# Patient Record
Sex: MALE | Race: WHITE | NOT HISPANIC OR LATINO | Employment: FULL TIME | URBAN - METROPOLITAN AREA
[De-identification: names, ages, dates, MRNs, and addresses within clinical notes are randomized per-mention and may not be internally consistent; named-entity substitution may affect disease eponyms.]

---

## 2018-05-11 ENCOUNTER — OFFICE VISIT - HEALTHEAST (OUTPATIENT)
Dept: FAMILY MEDICINE | Facility: CLINIC | Age: 43
End: 2018-05-11

## 2018-05-11 DIAGNOSIS — R59.0 POSTERIOR AURICULAR LYMPHADENOPATHY: ICD-10-CM

## 2018-05-11 DIAGNOSIS — R59.0 LYMPHADENOPATHY, SUPRACLAVICULAR: ICD-10-CM

## 2018-05-11 LAB
AMYLASE SERPL-CCNC: 114 U/L (ref 5–120)
BASOPHILS # BLD AUTO: 0.1 THOU/UL (ref 0–0.2)
BASOPHILS NFR BLD AUTO: 1 % (ref 0–2)
EOSINOPHIL # BLD AUTO: 0.1 THOU/UL (ref 0–0.4)
EOSINOPHIL NFR BLD AUTO: 1 % (ref 0–6)
ERYTHROCYTE [DISTWIDTH] IN BLOOD BY AUTOMATED COUNT: 12.7 % (ref 11–14.5)
HCT VFR BLD AUTO: 46.8 % (ref 40–54)
HGB BLD-MCNC: 16 G/DL (ref 14–18)
LYMPHOCYTES # BLD AUTO: 1.5 THOU/UL (ref 0.8–4.4)
LYMPHOCYTES NFR BLD AUTO: 25 % (ref 20–40)
MCH RBC QN AUTO: 30.2 PG (ref 27–34)
MCHC RBC AUTO-ENTMCNC: 34.2 G/DL (ref 32–36)
MCV RBC AUTO: 89 FL (ref 80–100)
MONOCYTES # BLD AUTO: 0.5 THOU/UL (ref 0–0.9)
MONOCYTES NFR BLD AUTO: 8 % (ref 2–10)
NEUTROPHILS # BLD AUTO: 4 THOU/UL (ref 2–7.7)
NEUTROPHILS NFR BLD AUTO: 66 % (ref 50–70)
PLATELET # BLD AUTO: 234 THOU/UL (ref 140–440)
PMV BLD AUTO: 10.4 FL (ref 8.5–12.5)
RBC # BLD AUTO: 5.29 MILL/UL (ref 4.4–6.2)
WBC: 6.2 THOU/UL (ref 4–11)

## 2018-05-11 ASSESSMENT — MIFFLIN-ST. JEOR: SCORE: 1971.48

## 2020-10-23 ENCOUNTER — VIRTUAL VISIT (OUTPATIENT)
Dept: FAMILY MEDICINE | Facility: OTHER | Age: 45
End: 2020-10-23
Payer: COMMERCIAL

## 2020-10-23 PROCEDURE — 99421 OL DIG E/M SVC 5-10 MIN: CPT | Performed by: NURSE PRACTITIONER

## 2020-10-23 NOTE — PROGRESS NOTES
"Date: 10/23/2020 06:28:08  Clinician: Racquel Quijano  Clinician NPI: [id number]  Patient: Rowdy Tan  Patient : 1975  Patient Address: [address] [place] [address]  Patient Phone: [phone]  Visit Protocol: URI  Patient Summary:  Rowdy is a 44 year old ( : 1975 ) male who initiated a [facility] Visit for COVID-19 (Coronavirus) evaluation and screening. When asked the question \"Please sign me up to receive news, health information and promotions from [facility].\", Rowdy responded \"No\".    Rowdy states his symptoms started 1-2 days ago.   His symptoms consist of a cough, nasal congestion, myalgia, chills, malaise, and a sore throat. He is experiencing difficulty breathing due to nasal congestion but he is not short of breath.   Symptom details     Nasal secretions: The color of his mucus is clear.    Cough: Rowdy coughs a few times an hour and his cough is more bothersome at night. Phlegm comes into his throat when he coughs. He believes his cough is caused by post-nasal drip. The color of the phlegm is clear.     Sore throat: Rowdy reports having mild throat pain (1-3 on a 10 point pain scale), does not have exudate on his tonsils, and can swallow liquids. He is not sure if the lymph nodes in his neck are enlarged. A rash has not appeared on the skin since the sore throat started.      Rowdy denies having ear pain, headache, wheezing, fever, anosmia, vomiting, rhinitis, nausea, facial pain or pressure, teeth pain, ageusia, and diarrhea. He also denies taking antibiotic medication in the past month and having recent facial or sinus surgery in the past 60 days.   Precipitating events  Rowdy is not sure if he has been exposed to someone with strep throat. He has not recently been exposed to someone with influenza. Rowdy has not been in close contact with any high risk individuals.   Pertinent COVID-19 (Coronavirus) information  In the past 14 days, Rowdy has not worked in " a congregate living setting.   He does not work or volunteer as healthcare worker or a  and does not work or volunteer in a healthcare facility.   Rowdy also has not lived in a congregate living setting in the past 14 days. He does not live with a healthcare worker.   Rowdy has had a close contact with a laboratory-confirmed COVID-19 patient within 14 days of symptom onset. Additional information about contact with COVID-19 (Coronavirus) patient as reported by the patient (free text): My son's friend just received a positive test and I've been around him in recent days   Since December 2019, Rowdy and has had upper respiratory infection (URI) or influenza-like illness. Has not been diagnosed with lab-confirmed COVID-19 test      Date(s) of previous URI or influenza-like illness (free-text): January 2020     Symptoms Rowdy experienced during previous URI or influenza-like illness as reported by the patient (free-text): I had a cold        Pertinent medical history  Rowdy does not need a return to work/school note.   Weight: 205 lbs   Rowdy smokes or uses smokeless tobacco.   Weight: 205 lbs    MEDICATIONS: No current medications, ALLERGIES: aspirin  Clinician Response:  Dear Rowdy,   Your symptoms show that you may have coronavirus (COVID-19). This illness can cause fever, cough and trouble breathing. Many people get a mild case and get better on their own. Some people can get very sick.  What should I do?  We would like to test you for this virus.   1. Please call [phone] to schedule your visit. Explain that you were referred by [facility] to have a COVID-19 test. Be ready to share your [facility] visit ID number.  The following will serve as your written order for this COVID Test, ordered by me, for the indication of suspected COVID [Z20.828]: The test will be ordered in [employer], our electronic health record, after you are scheduled. It will show as ordered and authorized by Steve Morgan MD.   "Order: COVID-19 (Coronavirus) PCR for [facility]MPTOMATIC [facility].      2. When it's time for your COVID test:  Stay at least 6 feet away from others. (If someone will drive you to your test, stay in the backseat, as far away from the  as you can.)   Cover your mouth and nose with a mask, tissue or washcloth.  Go straight to the testing site. Don't make any stops on the way there or back.      3.Starting now: Stay home and away from others (self-isolate) until:   You've had no fever---and no medicine that reduces fever---for one full day (24 hours). And...   Your other symptoms have gotten better. For example, your cough or breathing has improved. And...   At least 10 days have passed since your symptoms started.       During this time, don't leave the house except for testing or medical care.   Stay in your own room, even for meals. Use your own bathroom if you can.   Stay away from others in your home. No hugging, kissing or shaking hands. No visitors.  Don't go to work, school or anywhere else.    Clean \"high touch\" surfaces often (doorknobs, counters, handles, etc.). Use a household cleaning spray or wipes. You'll find a full list of  on the EPA website: www.epa.gov/pesticide-registration/list-n-disinfectants-use-against-sars-cov-2.   Cover your mouth and nose with a mask, tissue or washcloth to avoid spreading germs.  Wash your hands and face often. Use soap and water.  Caregivers in these groups are at risk for severe illness due to COVID-19:  o People 65 years and older  o People who live in a nursing home or long-term care facility  o People with chronic disease (lung, heart, cancer, diabetes, kidney, liver, immunologic)  o People who have a weakened immune system, including those who:   Are in cancer treatment  Take medicine that weakens the immune system, such as corticosteroids  Had a bone marrow or organ transplant  Have an immune deficiency  Have poorly controlled HIV or AIDS  Are " obese (body mass index of 40 or higher)  Smoke regularly   o Caregivers should wear gloves while washing dishes, handling laundry and cleaning bedrooms and bathrooms.  o Use caution when washing and drying laundry: Don't shake dirty laundry, and use the warmest water setting that you can.  o For more tips, go to www.cdc.gov/coronavirus/2019-ncov/downloads/10Things.pdf.    4.Sign up for [employer]. We know it's scary to hear that you might have COVID-19. We want to track your symptoms to make sure you're okay over the next 2 weeks. Please look for an email from [employer]---this is a free, online program that we'll use to keep in touch. To sign up, follow the link in the email. Learn more at [email or web address]  How can I take care of myself?   Get lots of rest. Drink extra fluids (unless a doctor has told you not to).   Take Tylenol (acetaminophen) for fever or pain. If you have liver or kidney problems, ask your family doctor if it's okay to take Tylenol.   Adults can take either:    650 mg (two 325 mg pills) every 4 to 6 hours, or...   1,000 mg (two 500 mg pills) every 8 hours as needed.    Note: Don't take more than 3,000 mg in one day. Acetaminophen is found in many medicines (both prescribed and over-the-counter medicines). Read all labels to be sure you don't take too much.   For children, check the Tylenol bottle for the right dose. The dose is based on the child's age or weight.    If you have other health problems (like cancer, heart failure, an organ transplant or severe kidney disease): Call your specialty clinic if you don't feel better in the next 2 days.       Know when to call 911. Emergency warning signs include:    Trouble breathing or shortness of breath Pain or pressure in the chest that doesn't go away Feeling confused like you haven't felt before, or not being able to wake up Bluish-colored lips or face.  Where can I get more information?   [facility] -- About COVID-19:  [email or web address]   CDC -- What to Do If You're Sick: www.cdc.gov/coronavirus/2019-ncov/about/steps-when-sick.html   CDC -- Ending Home Isolation: www.cdc.gov/coronavirus/2019-ncov/hcp/disposition-in-home-patients.html   CDC -- Caring for Someone: www.cdc.gov/coronavirus/2019-ncov/if-you-are-sick/care-for-someone.html   [facility] -- Interim Guidance for Hospital Discharge to Home: www.[facility].mn./diseases/coronavirus/hcp/hospdischarge.pdf   [facility] clinical trials (COVID-19 research studies): [email or web address]-clinical-trials    Below are the COVID-19 hotlines at the [place] Department of Health ([facility]). Interpreters are available.    For health questions: Call 162-782-3170 or 1-417.507.5796 (7 a.m. to 7 p.m.) For questions about schools and childcare: Call [phone] or 1-280.405.9359 (7 a.m. to 7 p.m.)    Diagnosis: Cough  Diagnosis ICD: R05

## 2020-10-25 ENCOUNTER — AMBULATORY - HEALTHEAST (OUTPATIENT)
Dept: FAMILY MEDICINE | Facility: CLINIC | Age: 45
End: 2020-10-25

## 2020-10-25 DIAGNOSIS — Z20.822 SUSPECTED COVID-19 VIRUS INFECTION: ICD-10-CM

## 2021-05-29 ENCOUNTER — RECORDS - HEALTHEAST (OUTPATIENT)
Dept: ADMINISTRATIVE | Facility: CLINIC | Age: 46
End: 2021-05-29

## 2021-05-31 ENCOUNTER — RECORDS - HEALTHEAST (OUTPATIENT)
Dept: ADMINISTRATIVE | Facility: CLINIC | Age: 46
End: 2021-05-31

## 2021-06-01 ENCOUNTER — RECORDS - HEALTHEAST (OUTPATIENT)
Dept: ADMINISTRATIVE | Facility: CLINIC | Age: 46
End: 2021-06-01

## 2021-06-01 VITALS — HEIGHT: 72 IN | BODY MASS INDEX: 31.69 KG/M2 | WEIGHT: 234 LBS

## 2021-06-16 PROBLEM — R22.1 LUMP ON NECK: Status: ACTIVE | Noted: 2018-05-11

## 2021-06-17 NOTE — PROGRESS NOTES
Assessment:     1. Lymphadenopathy, supraclavicular  Amylase    HM1(CBC and Differential)    HM1 (CBC with Diff)   2. Posterior auricular lymphadenopathy       Results for orders placed or performed in visit on 05/11/18   Amylase   Result Value Ref Range    Amylase 114 5 - 120 U/L   HM1 (CBC with Diff)   Result Value Ref Range    WBC 6.2 4.0 - 11.0 thou/uL    RBC 5.29 4.40 - 6.20 mill/uL    Hemoglobin 16.0 14.0 - 18.0 g/dL    Hematocrit 46.8 40.0 - 54.0 %    MCV 89 80 - 100 fL    MCH 30.2 27.0 - 34.0 pg    MCHC 34.2 32.0 - 36.0 g/dL    RDW 12.7 11.0 - 14.5 %    Platelets 234 140 - 440 thou/uL    MPV 10.4 8.5 - 12.5 fL    Neutrophils % 66 50 - 70 %    Lymphocytes % 25 20 - 40 %    Monocytes % 8 2 - 10 %    Eosinophils % 1 0 - 6 %    Basophils % 1 0 - 2 %    Neutrophils Absolute 4.0 2.0 - 7.7 thou/uL    Lymphocytes Absolute 1.5 0.8 - 4.4 thou/uL    Monocytes Absolute 0.5 0.0 - 0.9 thou/uL    Eosinophils Absolute 0.1 0.0 - 0.4 thou/uL    Basophils Absolute 0.1 0.0 - 0.2 thou/uL        Plan:     Discussed with the patient that he has inflammation of his lymph nodes.  Will do CBC to rule out infection and ambulates to rule out lymphadenopathy.  Discussed negative results with the patient. In the meantime I have advised patient to use ibuprofen for pain or discomfort.  He may also use a warm compress to the area.  Patient is aware that if his symptoms continues to be persistent he may need to follow-up with PCP for further evaluation.    Subjective:       42 y.o. male presents for evaluation of a lump on his right side of the neck.  Patient reports that he has felt the lump for about 6 weeks on the lateral side of the neck.  He also reports that yesterday he noticed some swelling behind his right ear.  It was very painful to touch.  He denies flulike symptoms, he reports that he has had a cough, persistent.  He has some allergies, denies runny nose.  He denies fever, no exposure to anyone with a illness.  Denies nausea,  "vomiting, diarrhea, or shortness of breath.  He reports that he noticed also a lump on either side of the neck, left side.  Which he usually get inflamed especially right before he gets a cold.  He is there to be evaluated just to make sure that everything is okay and is not malignant.      The following portions of the patient's history were reviewed and updated as appropriate: allergies, current medications, past family history, past medical history, past social history, past surgical history and problem list.    Review of Systems  A 12 point comprehensive review of systems was negative except as noted.     Objective:      /86 (Patient Site: Right Arm, Patient Position: Sitting, Cuff Size: Adult Large)  Pulse 80  Temp 98.3  F (36.8  C) (Oral)   Resp 18  Ht 5' 11.5\" (1.816 m)  Wt (!) 234 lb (106.1 kg)  SpO2 96%  BMI 32.18 kg/m2  General appearance: alert, appears stated age, cooperative and no distress  Head: Normocephalic, without obvious abnormality, atraumatic, sinuses nontender to percussion  Eyes: conjunctivae/corneas clear. PERRL, EOM's intact. Fundi benign.  Ears: normal TM's and external ear canals both ears  Nose: Nares normal. Septum midline. Mucosa normal. No drainage or sinus tenderness., no discharge, no sinus tenderness  Throat: lips, mucosa, and tongue normal; teeth and gums normal  Lungs: clear to auscultation bilaterally  Heart: regular rate and rhythm, S1, S2 normal, no murmur, click, rub or gallop  Skin: Skin color, texture, turgor normal. No rashes or lesions  Lymph nodes: Supraclavicular adenopathy: hypertrophy and post auricular hypertrophy  Neurologic: Grossly normal     This note has been dictated using voice recognition software. Any grammatical or context distortions are unintentional and inherent to the software  "

## 2021-06-20 ENCOUNTER — HEALTH MAINTENANCE LETTER (OUTPATIENT)
Age: 46
End: 2021-06-20

## 2021-09-21 ENCOUNTER — OFFICE VISIT (OUTPATIENT)
Dept: FAMILY MEDICINE | Facility: CLINIC | Age: 46
End: 2021-09-21
Payer: COMMERCIAL

## 2021-09-21 VITALS
HEIGHT: 72 IN | DIASTOLIC BLOOD PRESSURE: 88 MMHG | SYSTOLIC BLOOD PRESSURE: 126 MMHG | HEART RATE: 67 BPM | WEIGHT: 219.38 LBS | OXYGEN SATURATION: 97 % | BODY MASS INDEX: 29.71 KG/M2

## 2021-09-21 DIAGNOSIS — M79.604 LEG PAIN, BILATERAL: ICD-10-CM

## 2021-09-21 DIAGNOSIS — M79.605 LEG PAIN, BILATERAL: ICD-10-CM

## 2021-09-21 DIAGNOSIS — Z13.1 SCREENING FOR DIABETES MELLITUS: ICD-10-CM

## 2021-09-21 DIAGNOSIS — F33.42 RECURRENT MAJOR DEPRESSIVE DISORDER, IN FULL REMISSION (H): ICD-10-CM

## 2021-09-21 DIAGNOSIS — Z23 INFLUENZA VACCINE NEEDED: ICD-10-CM

## 2021-09-21 DIAGNOSIS — Z23 NEED FOR TETANUS BOOSTER: ICD-10-CM

## 2021-09-21 DIAGNOSIS — E66.811 CLASS 1 OBESITY DUE TO EXCESS CALORIES WITHOUT SERIOUS COMORBIDITY WITH BODY MASS INDEX (BMI) OF 30.0 TO 30.9 IN ADULT: ICD-10-CM

## 2021-09-21 DIAGNOSIS — Z13.220 SCREENING FOR LIPOID DISORDERS: ICD-10-CM

## 2021-09-21 DIAGNOSIS — Z13.220 SCREENING FOR HYPERLIPIDEMIA: ICD-10-CM

## 2021-09-21 DIAGNOSIS — R22.1 MASS OF NECK: Primary | ICD-10-CM

## 2021-09-21 DIAGNOSIS — Z00.00 ANNUAL PHYSICAL EXAM: ICD-10-CM

## 2021-09-21 DIAGNOSIS — E66.09 CLASS 1 OBESITY DUE TO EXCESS CALORIES WITHOUT SERIOUS COMORBIDITY WITH BODY MASS INDEX (BMI) OF 30.0 TO 30.9 IN ADULT: ICD-10-CM

## 2021-09-21 DIAGNOSIS — Z00.00 HEALTHCARE MAINTENANCE: ICD-10-CM

## 2021-09-21 PROCEDURE — 90472 IMMUNIZATION ADMIN EACH ADD: CPT | Performed by: NURSE PRACTITIONER

## 2021-09-21 PROCEDURE — 90686 IIV4 VACC NO PRSV 0.5 ML IM: CPT | Performed by: NURSE PRACTITIONER

## 2021-09-21 PROCEDURE — 90714 TD VACC NO PRESV 7 YRS+ IM: CPT | Performed by: NURSE PRACTITIONER

## 2021-09-21 PROCEDURE — 90471 IMMUNIZATION ADMIN: CPT | Performed by: NURSE PRACTITIONER

## 2021-09-21 PROCEDURE — 99213 OFFICE O/P EST LOW 20 MIN: CPT | Mod: 25 | Performed by: NURSE PRACTITIONER

## 2021-09-21 PROCEDURE — 99386 PREV VISIT NEW AGE 40-64: CPT | Mod: 25 | Performed by: NURSE PRACTITIONER

## 2021-09-21 ASSESSMENT — MIFFLIN-ST. JEOR: SCORE: 1905.14

## 2021-09-21 NOTE — PATIENT INSTRUCTIONS
I would like to see your BMI closer to 26 or 27.  Continue with weight loss strategies.    Blood pressure and other vital signs look good today.    Flu shot today.    Tetanus booster today.    Colon cancer and prostate cancer screening can begin at age 50.    Consider getting your Covid booster when offered.  Look for guidelines on the Dominick & Dominick vaccine.    Continue monitoring the legs.    Call [phone] to schedule the ultrasound of the neck.  Results will come through on [employer].

## 2021-09-21 NOTE — PROGRESS NOTES
SUBJECTIVE:   CC: Rowdy Tan is an 45 year old male who presents for preventative health visit.       Patient has been advised of split billing requirements and indicates understanding: Yes  Healthy Habits:     Getting at least 3 servings of Calcium per day:  Yes    Bi-annual eye exam:  NO    Dental care twice a year:  NO    Sleep apnea or symptoms of sleep apnea:  None    Diet:  Regular (no restrictions)    Frequency of exercise:  2-3 days/week    Duration of exercise:  30-45 minutes    Taking medications regularly:  Not Applicable    Medication side effects:  Not applicable    PHQ-2 Total Score: 0    Additional concerns today:  Yes      Today's PHQ-2 Score:   PHQ-2 (  [employer]) 2021   Q1: Little interest or pleasure in doing things 0   Q2: Feeling down, depressed or hopeless 0   PHQ-2 Score 0   Q1: Little interest or pleasure in doing things Not at all   Q2: Feeling down, depressed or hopeless Not at all   PHQ-2 Score 0       Abuse: Current or Past(Physical, Sexual or Emotional)- No  Do you feel safe in your environment? Yes    Have you ever done Advance Care Planning? (For example, a Health Directive, POLST, or a discussion with a medical provider or your loved ones about your wishes): No, advance care planning information given to patient to review.  Patient plans to discuss their wishes with loved ones or provider.      Social History     Tobacco Use     Smoking status: Former Smoker     Types: Cigarettes, Cigarettes     Quit date: 1996     Years since quittin.4     Smokeless tobacco: Current User   Substance Use Topics     Alcohol use: Not on file     If you drink alcohol do you typically have >3 drinks per day or >7 drinks per week? No    Alcohol Use 2021   Prescreen: >3 drinks/day or >7 drinks/week? Yes   AUDIT SCORE  5     AUDIT - Alcohol Use Disorders Identification Test - Reproduced from the World Health Organization Audit 2001 (Second Edition) 2021   1.  How often do you  have a drink containing alcohol? 2 to 3 times a week   2.  How many drinks containing alcohol do you have on a typical day when you are drinking? 3 or 4   3.  How often do you have five or more drinks on one occasion? Less than monthly   4.  How often during the last year have you found that you were not able to stop drinking once you had started? Never   5.  How often during the last year have you failed to do what was normally expected of you because of drinking? Never   6.  How often during the last year have you needed a first drink in the morning to get yourself going after a heavy drinking session? Never   7.  How often during the last year have you had a feeling of guilt or remorse after drinking? Never   8.  How often during the last year have you been unable to remember what happened the night before because of your drinking? Never   9.  Have you or someone else been injured because of your drinking? No   10. Has a relative, friend, doctor or other health care worker been concerned about your drinking or suggested you cut down? No   TOTAL SCORE 5       Last PSA: No results found for: PSA    Reviewed orders with patient. Reviewed health maintenance and updated orders accordingly - Yes  Lab work is in process    Reviewed and updated as needed this visit by clinical staff                 Reviewed and updated as needed this visit by Provider                    Review of Systems  CONSTITUTIONAL: NEGATIVE for fever, chills, change in weight  INTEGUMENTARY/SKIN: NEGATIVE for worrisome rashes, moles or lesions  EYES: NEGATIVE for vision changes or irritation  ENT: NEGATIVE for ear, mouth and throat problems  RESP: NEGATIVE for significant cough or SOB  CV: NEGATIVE for chest pain, palpitations or peripheral edema  GI: NEGATIVE for nausea, abdominal pain, heartburn, or change in bowel habits   male: negative for dysuria, hematuria, decreased urinary stream, erectile dysfunction, urethral  discharge  MUSCULOSKELETAL: NEGATIVE for significant arthralgias or myalgia  NEURO: NEGATIVE for weakness, dizziness or paresthesias  PSYCHIATRIC: NEGATIVE for changes in mood or affect    OBJECTIVE:   There were no vitals taken for this visit.    Physical Exam  GENERAL: healthy, alert and no distress  NECK: no adenopathy, no asymmetry, masses, or scars and thyroid normal to palpation  RESP: lungs clear to auscultation - no rales, rhonchi or wheezes  CV: regular rate and rhythm, normal S1 S2, no S3 or S4, no murmur, click or rub, no peripheral edema and peripheral pulses strong  ABDOMEN: soft, nontender, no hepatosplenomegaly, no masses and bowel sounds normal  MS: no gross musculoskeletal defects noted, no edema    Diagnostic Test Results:  Labs reviewed in Epic    ASSESSMENT/PLAN:     1. Annual physical exam  We spent some time talking about his weight today.    2. Healthcare maintenance  Colon cancer and prostate cancer screening tests can begin at age 50    3. Screening for hyperlipidemia  Future labs ordered for lipid profile    4. Mass of neck  Mass in his neck is been present for many years.  At this point, is reasonable to pursue ultrasound.  Suspect sclerotic lymph node but cannot rule out other etiologies  - US Head Neck Soft Tissue; Future    5. Screening for diabetes mellitus  Given his obesity, increased risk  - Hemoglobin A1c; Future    6. Screening for lipoid disorders  As above  - Lipid Profile (Chol, Trig, HDL, LDL calc); Future    7. Need for tetanus booster  Due for tetanus booster  - TD PRESERV FREE, IM (7+ YRS) (DECAVAC/TENIVAC)    8. Influenza vaccine needed  We will give him his flu shot today  - INFLUENZA VACCINE IM >6 MO VALENT IIV4 (ALFURIA/FLUZONE)    9. Leg pain, bilateral  Seems to only happen when he is walking.  Associated with some mild swelling.  Does not sound like claudication related issue.  Continue to monitor for now    10. Recurrent major depressive disorder, in full remission  "(H)  Stable    11. Class 1 obesity due to excess calories without serious comorbidity with body mass index (BMI) of 30.0 to 30.9 in adult  He has lost a bit of weight since 2018.  Told him his goal BMI for next year would be 26 or 27    Patient Instructions   I would like to see your BMI closer to 26 or 27.  Continue with weight loss strategies.    Blood pressure and other vital signs look good today.    Flu shot today.    Tetanus booster today.    Colon cancer and prostate cancer screening can begin at age 50.    Consider getting your Covid booster when offered.  Look for guidelines on the Dominick & Dominick vaccine.    Continue monitoring the legs.    Call [phone] to schedule the ultrasound of the neck.  Results will come through on [employer].        Patient has been advised of split billing requirements and indicates understanding: No  COUNSELING:   Reviewed preventive health counseling, as reflected in patient instructions       Regular exercise       Healthy diet/nutrition    Estimated body mass index is 32.18 kg/m  as calculated from the following:    Height as of 5/11/18: 1.816 m (5' 11.5\").    Weight as of 5/11/18: 106.1 kg (234 lb).     Weight management plan: Patient was referred to their PCP to discuss a diet and exercise plan.    He reports that he quit smoking about 25 years ago. His smoking use included cigarettes and cigarettes. He uses smokeless tobacco.      Counseling Resources:  ATP IV Guidelines  Pooled Cohorts Equation Calculator  FRAX Risk Assessment  ICSI Preventive Guidelines  Dietary Guidelines for Americans, 2010  USDA's MyPlate  ASA Prophylaxis  Lung CA Screening    Axel Andrade, [facility]  "

## 2021-09-23 ENCOUNTER — LAB (OUTPATIENT)
Dept: LAB | Facility: CLINIC | Age: 46
End: 2021-09-23
Payer: COMMERCIAL

## 2021-09-23 DIAGNOSIS — Z13.1 SCREENING FOR DIABETES MELLITUS: ICD-10-CM

## 2021-09-23 DIAGNOSIS — Z13.220 SCREENING FOR LIPOID DISORDERS: ICD-10-CM

## 2021-09-23 LAB
CHOLEST SERPL-MCNC: 228 MG/DL
FASTING STATUS PATIENT QL REPORTED: YES
HBA1C MFR BLD: 5.1 % (ref 0–5.6)
HDLC SERPL-MCNC: 47 MG/DL
LDLC SERPL CALC-MCNC: 138 MG/DL
TRIGL SERPL-MCNC: 217 MG/DL

## 2021-09-23 PROCEDURE — 80061 LIPID PANEL: CPT

## 2021-09-23 PROCEDURE — 83036 HEMOGLOBIN GLYCOSYLATED A1C: CPT

## 2021-09-23 PROCEDURE — 36415 COLL VENOUS BLD VENIPUNCTURE: CPT

## 2021-10-05 ENCOUNTER — HOSPITAL ENCOUNTER (OUTPATIENT)
Dept: ULTRASOUND IMAGING | Facility: CLINIC | Age: 46
Discharge: HOME | End: 2021-10-05
Attending: NURSE PRACTITIONER | Admitting: NURSE PRACTITIONER
Payer: COMMERCIAL

## 2021-10-05 DIAGNOSIS — R22.1 MASS OF NECK: ICD-10-CM

## 2021-10-05 PROCEDURE — 76536 US EXAM OF HEAD AND NECK: CPT

## 2021-11-18 ENCOUNTER — E-VISIT (OUTPATIENT)
Dept: URGENT CARE | Facility: CLINIC | Age: 46
End: 2021-11-18
Payer: COMMERCIAL

## 2021-11-18 DIAGNOSIS — Z20.822 SUSPECTED COVID-19 VIRUS INFECTION: Primary | ICD-10-CM

## 2021-11-18 PROCEDURE — 99421 OL DIG E/M SVC 5-10 MIN: CPT | Performed by: PHYSICIAN ASSISTANT

## 2021-11-18 NOTE — PATIENT INSTRUCTIONS
Dear Rowdy Tan,    Your symptoms show that you may have coronavirus (COVID-19). This illness can cause fever, cough and trouble breathing. Many people get a mild case and get better on their own. Some people can get very sick.    Will I be tested for COVID-19?  We would like to test you for Covid-19 virus. I have placed orders for this test.     To schedule: go to your [employer] home page and scroll down to the section that says  You have an appointment that needs to be scheduled  and click the large green button that says  Schedule Now  and follow the steps to find the next available openings.    If you are unable to complete these [employer] scheduling steps, please call [phone] to schedule your testing.     Return to work/school/ guidance:  Please let your workplace manager and staffing office know when your quarantine ends     We can t give you an exact date as it depends on the above. You can calculate this on your own or work with your manager/staffing office to calculate this. (For example if you were exposed on 10/4, you would have to quarantine for 14 full days. That would be through 10/18. You could return on 10/19.)      If you receive a positive COVID-19 test result, follow the guidance of the those who are giving you the results. Usually the return to work is 10 (or in some cases 20 days from symptom onset.) If you work at [facility], you must also be cleared by Employee Occupational Health and Safety to return to work.        If you receive a negative COVID-19 test result and did not have a high risk exposure to someone with a known positive COVID-19 test, you can return to work once you're free of fever for 24 hours without fever-reducing medication and your symptoms are improving or resolved.      If you receive a negative COVID-19 test and If you had a high risk exposure to someone who has tested positive for COVID-19 then you can return to work 14 days after your last contact  with the positive individual    Note: If you have ongoing exposure to the covid positive person, this quarantine period may be more than 14 days. (For example, if you are continued to be exposed to your child who tested positive and cannot isolate from them, then the quarantine of 7-14 days can't start until your child is no longer contagious. This is typically 10 days from onset of the child's symptoms. So the total duration may be 17-24 days in this case.)    Sign up for [facility].   We know it's scary to hear that you might have COVID-19. We want to track your symptoms to make sure you're okay over the next 2 weeks. Please look for an email from [facility]--this is a free, online program that we'll use to keep in touch. To sign up, follow the link in the email you will receive. Learn more at [email or web address]    How can I take care of myself?    Get lots of rest. Drink extra fluids (unless a doctor has told you not to)    Take Tylenol (acetaminophen) or ibuprofen for fever or pain. If you have liver or kidney problems, ask your family doctor if it's okay to take Tylenol o ibuprofen    If you have other health problems (like cancer, heart failure, an organ transplant or severe kidney disease): Call your specialty clinic if you don't feel better in the next 2 days.    Know when to call 911. Emergency warning signs include:  o Trouble breathing or shortness of breath  o Pain or pressure in the chest that doesn't go away  o Feeling confused like you haven't felt before, or not being able to wake up  o Bluish-colored lips or face    Where can I get more information?  [facility] - About COVID-19:   [email or web address]    CDC - What to Do If You're Sick:   www.cdc.gov/coronavirus/2019-ncov/about/steps-when-sick.html

## 2021-11-19 ENCOUNTER — LAB (OUTPATIENT)
Dept: FAMILY MEDICINE | Facility: CLINIC | Age: 46
End: 2021-11-19
Attending: PHYSICIAN ASSISTANT
Payer: COMMERCIAL

## 2021-11-19 DIAGNOSIS — Z20.822 SUSPECTED COVID-19 VIRUS INFECTION: ICD-10-CM

## 2021-11-19 PROCEDURE — U0005 INFEC AGEN DETEC AMPLI PROBE: HCPCS

## 2021-11-19 PROCEDURE — U0003 INFECTIOUS AGENT DETECTION BY NUCLEIC ACID (DNA OR RNA); SEVERE ACUTE RESPIRATORY SYNDROME CORONAVIRUS 2 (SARS-COV-2) (CORONAVIRUS DISEASE [COVID-19]), AMPLIFIED PROBE TECHNIQUE, MAKING USE OF HIGH THROUGHPUT TECHNOLOGIES AS DESCRIBED BY CMS-2020-01-R: HCPCS

## 2021-11-21 ENCOUNTER — E-VISIT (OUTPATIENT)
Dept: FAMILY MEDICINE | Facility: CLINIC | Age: 46
End: 2021-11-21
Payer: COMMERCIAL

## 2021-11-21 DIAGNOSIS — J02.0 STREP THROAT: Primary | ICD-10-CM

## 2021-11-21 LAB — SARS-COV-2 RNA RESP QL NAA+PROBE: POSITIVE

## 2021-11-21 PROCEDURE — 99207 PR NON-BILLABLE SERV PER CHARTING: CPT | Performed by: NURSE PRACTITIONER

## 2021-11-22 ENCOUNTER — APPOINTMENT (OUTPATIENT)
Dept: CT IMAGING | Facility: CLINIC | Age: 46
End: 2021-11-22
Payer: COMMERCIAL

## 2021-11-22 ENCOUNTER — HOSPITAL ENCOUNTER (EMERGENCY)
Facility: CLINIC | Age: 46
Discharge: HOME | End: 2021-11-22
Admitting: EMERGENCY MEDICINE
Payer: COMMERCIAL

## 2021-11-22 ENCOUNTER — NURSE TRIAGE (OUTPATIENT)
Dept: NURSING | Facility: CLINIC | Age: 46
End: 2021-11-22

## 2021-11-22 ENCOUNTER — E-VISIT (OUTPATIENT)
Dept: INTERNAL MEDICINE | Facility: CLINIC | Age: 46
End: 2021-11-22
Payer: COMMERCIAL

## 2021-11-22 VITALS
DIASTOLIC BLOOD PRESSURE: 75 MMHG | HEIGHT: 71 IN | RESPIRATION RATE: 30 BRPM | TEMPERATURE: 98.9 F | SYSTOLIC BLOOD PRESSURE: 116 MMHG | BODY MASS INDEX: 29.4 KG/M2 | HEART RATE: 86 BPM | WEIGHT: 210 LBS | OXYGEN SATURATION: 94 %

## 2021-11-22 DIAGNOSIS — U07.1 PNEUMONIA DUE TO COVID-19 VIRUS: ICD-10-CM

## 2021-11-22 DIAGNOSIS — J12.82 PNEUMONIA DUE TO COVID-19 VIRUS: ICD-10-CM

## 2021-11-22 DIAGNOSIS — Z20.822 SUSPECTED COVID-19 VIRUS INFECTION: ICD-10-CM

## 2021-11-22 DIAGNOSIS — J02.9 SORE THROAT: ICD-10-CM

## 2021-11-22 DIAGNOSIS — R05.8 PRODUCTIVE COUGH: Primary | ICD-10-CM

## 2021-11-22 LAB
ANION GAP SERPL CALCULATED.3IONS-SCNC: 12 MMOL/L (ref 5–18)
ATRIAL RATE - MUSE: 104 BPM
BASOPHILS # BLD AUTO: 0 10E3/UL (ref 0–0.2)
BASOPHILS NFR BLD AUTO: 0 %
BUN SERPL-MCNC: 18 MG/DL (ref 8–22)
CALCIUM SERPL-MCNC: 8.6 MG/DL (ref 8.5–10.5)
CHLORIDE BLD-SCNC: 102 MMOL/L (ref 98–107)
CO2 SERPL-SCNC: 25 MMOL/L (ref 22–31)
CREAT SERPL-MCNC: 1.06 MG/DL (ref 0.7–1.3)
D DIMER PPP FEU-MCNC: 0.56 UG/ML FEU (ref 0–0.5)
DIASTOLIC BLOOD PRESSURE - MUSE: 81 MMHG
EOSINOPHIL # BLD AUTO: 0 10E3/UL (ref 0–0.7)
EOSINOPHIL NFR BLD AUTO: 0 %
ERYTHROCYTE [DISTWIDTH] IN BLOOD BY AUTOMATED COUNT: 12.7 % (ref 10–15)
GFR SERPL CREATININE-BSD FRML MDRD: 84 ML/MIN/1.73M2
GLUCOSE BLD-MCNC: 92 MG/DL (ref 70–125)
HCT VFR BLD AUTO: 48.5 % (ref 40–53)
HGB BLD-MCNC: 16.4 G/DL (ref 13.3–17.7)
IMM GRANULOCYTES # BLD: 0 10E3/UL
IMM GRANULOCYTES NFR BLD: 0 %
INTERPRETATION ECG - MUSE: NORMAL
LYMPHOCYTES # BLD AUTO: 1 10E3/UL (ref 0.8–5.3)
LYMPHOCYTES NFR BLD AUTO: 14 %
MAGNESIUM SERPL-MCNC: 1.9 MG/DL (ref 1.8–2.6)
MCH RBC QN AUTO: 30.7 PG (ref 26.5–33)
MCHC RBC AUTO-ENTMCNC: 33.8 G/DL (ref 31.5–36.5)
MCV RBC AUTO: 91 FL (ref 78–100)
MONOCYTES # BLD AUTO: 0.4 10E3/UL (ref 0–1.3)
MONOCYTES NFR BLD AUTO: 6 %
NEUTROPHILS # BLD AUTO: 5.2 10E3/UL (ref 1.6–8.3)
NEUTROPHILS NFR BLD AUTO: 80 %
NRBC # BLD AUTO: 0 10E3/UL
NRBC BLD AUTO-RTO: 0 /100
P AXIS - MUSE: 37 DEGREES
PLATELET # BLD AUTO: 188 10E3/UL (ref 150–450)
POTASSIUM BLD-SCNC: 3.5 MMOL/L (ref 3.5–5)
PR INTERVAL - MUSE: 130 MS
QRS DURATION - MUSE: 80 MS
QT - MUSE: 306 MS
QTC - MUSE: 402 MS
R AXIS - MUSE: 25 DEGREES
RBC # BLD AUTO: 5.34 10E6/UL (ref 4.4–5.9)
SODIUM SERPL-SCNC: 139 MMOL/L (ref 136–145)
SYSTOLIC BLOOD PRESSURE - MUSE: 135 MMHG
T AXIS - MUSE: 3 DEGREES
TROPONIN I SERPL-MCNC: 0.04 NG/ML (ref 0–0.29)
VENTRICULAR RATE- MUSE: 104 BPM
WBC # BLD AUTO: 6.7 10E3/UL (ref 4–11)

## 2021-11-22 PROCEDURE — 96374 THER/PROPH/DIAG INJ IV PUSH: CPT | Mod: 59

## 2021-11-22 PROCEDURE — 258N000003 HC RX IP 258 OP 636: Performed by: EMERGENCY MEDICINE

## 2021-11-22 PROCEDURE — 250N000011 HC RX IP 250 OP 636: Performed by: EMERGENCY MEDICINE

## 2021-11-22 PROCEDURE — 99421 OL DIG E/M SVC 5-10 MIN: CPT | Performed by: PHYSICIAN ASSISTANT

## 2021-11-22 PROCEDURE — 71275 CT ANGIOGRAPHY CHEST: CPT

## 2021-11-22 PROCEDURE — 36415 COLL VENOUS BLD VENIPUNCTURE: CPT | Performed by: EMERGENCY MEDICINE

## 2021-11-22 PROCEDURE — 85025 COMPLETE CBC W/AUTO DIFF WBC: CPT | Performed by: EMERGENCY MEDICINE

## 2021-11-22 PROCEDURE — 99285 EMERGENCY DEPT VISIT HI MDM: CPT | Mod: 25

## 2021-11-22 PROCEDURE — 93005 ELECTROCARDIOGRAM TRACING: CPT | Performed by: EMERGENCY MEDICINE

## 2021-11-22 PROCEDURE — 83735 ASSAY OF MAGNESIUM: CPT | Performed by: EMERGENCY MEDICINE

## 2021-11-22 PROCEDURE — 80048 BASIC METABOLIC PNL TOTAL CA: CPT | Performed by: EMERGENCY MEDICINE

## 2021-11-22 PROCEDURE — 84484 ASSAY OF TROPONIN QUANT: CPT | Performed by: EMERGENCY MEDICINE

## 2021-11-22 PROCEDURE — 250N000013 HC RX MED GY IP 250 OP 250 PS 637: Performed by: EMERGENCY MEDICINE

## 2021-11-22 PROCEDURE — 96361 HYDRATE IV INFUSION ADD-ON: CPT

## 2021-11-22 PROCEDURE — 85379 FIBRIN DEGRADATION QUANT: CPT | Performed by: EMERGENCY MEDICINE

## 2021-11-22 PROCEDURE — 96375 TX/PRO/DX INJ NEW DRUG ADDON: CPT | Mod: 59

## 2021-11-22 RX ORDER — ONDANSETRON 2 MG/ML
4 INJECTION INTRAMUSCULAR; INTRAVENOUS ONCE
Status: COMPLETED | OUTPATIENT
Start: 2021-11-22 | End: 2021-11-22

## 2021-11-22 RX ORDER — IOPAMIDOL 755 MG/ML
100 INJECTION, SOLUTION INTRAVASCULAR ONCE
Status: COMPLETED | OUTPATIENT
Start: 2021-11-22 | End: 2021-11-22

## 2021-11-22 RX ORDER — KETOROLAC TROMETHAMINE 15 MG/ML
15 INJECTION, SOLUTION INTRAMUSCULAR; INTRAVENOUS ONCE
Status: COMPLETED | OUTPATIENT
Start: 2021-11-22 | End: 2021-11-22

## 2021-11-22 RX ORDER — ACETAMINOPHEN 325 MG/1
650 TABLET ORAL ONCE
Status: COMPLETED | OUTPATIENT
Start: 2021-11-22 | End: 2021-11-22

## 2021-11-22 RX ADMIN — KETOROLAC TROMETHAMINE 15 MG: 15 INJECTION, SOLUTION INTRAMUSCULAR; INTRAVENOUS at 12:09

## 2021-11-22 RX ADMIN — ONDANSETRON 4 MG: 2 INJECTION INTRAMUSCULAR; INTRAVENOUS at 12:09

## 2021-11-22 RX ADMIN — ACETAMINOPHEN 650 MG: 325 TABLET ORAL at 12:01

## 2021-11-22 RX ADMIN — SODIUM CHLORIDE 1000 ML: 9 INJECTION, SOLUTION INTRAVENOUS at 12:07

## 2021-11-22 RX ADMIN — IOPAMIDOL 100 ML: 755 INJECTION, SOLUTION INTRAVENOUS at 15:05

## 2021-11-22 ASSESSMENT — ENCOUNTER SYMPTOMS
NAUSEA: 0
CHILLS: 1
SHORTNESS OF BREATH: 1
DIFFICULTY URINATING: 0
ABDOMINAL PAIN: 0
DIARRHEA: 0
APPETITE CHANGE: 1
FEVER: 1
SORE THROAT: 1
MYALGIAS: 1
CONSTIPATION: 1
BLOOD IN STOOL: 0
COUGH: 1
VOMITING: 1

## 2021-11-22 ASSESSMENT — MIFFLIN-ST. JEOR: SCORE: 1854.68

## 2021-11-22 NOTE — TELEPHONE ENCOUNTER
"Patient calling in today stating he is positive for COVID19  Coughing up phlegm. HE feels like he is unable to take a deep breath. He stated he cannot take a full deep breath in, \"full inhale\" Temp 101.8 now  After a coughing jag that lasted 20 minutes where he coughed up sticky phlegm.   Patient did an evisit this am, he did not understand the evisit and called in. Per RN protocol he should be seen in the ER and advised that a message will be sent to PCP requesting a Second Level Triage. Wife was given information about the MNRAP program per [facility]. They will apply for this. They declined Second Level Triage and stated they are going now to the ER.    [facility] information provided to patient  [email or web address]/[email or web address]/[email or web address]  Keep in mind that the monoclonal antibody treatments currently available through MNRAP have the same eligibility criteria. Which one you receive will be up to the health care facility where your referral is sent. As more treatment options become available, patients may be referred for specific treatments depending on which one they are most likely to benefit from.    COVID 19 Nurse Triage Plan/Patient Instructions    Please be aware that novel coronavirus (COVID-19) may be circulating in the community. If you develop symptoms such as fever, cough, or SOB or if you have concerns about the presence of another infection including coronavirus (COVID-19), please contact your health care provider or visit [email or web address].     Disposition/Instructions    In-Person Visit with provider recommended. Reference Visit Selection Guide.    Thank you for taking steps to prevent the spread of this virus.  o Limit your contact with others.  o Wear a simple mask to cover your cough.  o Wash your hands well and often.    Resources    [facility]: About COVID-19: [email or web address]    CDC: What to Do If You're Sick: " www.cdc.gov/coronavirus/2019-ncov/about/steps-when-sick.html    CDC: Ending Home Isolation: www.cdc.gov/coronavirus/2019-ncov/hcp/disposition-in-home-patients.html     CDC: Caring for Someone: www.cdc.gov/coronavirus/2019-ncov/if-you-are-sick/care-for-someone.html     [facility]: Interim Guidance for Hospital Discharge to Home: www.health.[facility].mn./diseases/coronavirus/hcp/hospdischarge.pdf    [facility] clinical trials (COVID-19 research studies): clinicalaffairs.[facility]-clinical-trials     Below are the COVID-19 hotlines at the [place] [facility] ([facility]). Interpreters are available.   o For health questions: Call 769-831-9345 or 1-759.750.7212 (7 a.m. to 7 p.m.)  o For questions about schools and childcare: Call 330-096-7322 or 1-650.651.2713 (7 a.m. to 7 p.m.)   China Parson RN, BSN Care Connection Triage Nurse                Reason for Disposition    MILD difficulty breathing (e.g., minimal/no SOB at rest, SOB with walking, pulse <100)     Declined Second Level Triage    Additional Information    Negative: SEVERE difficulty breathing (e.g., struggling for each breath, speaks in single words)    Negative: Difficult to awaken or acting confused (e.g., disoriented, slurred speech)    Negative: Bluish (or gray) lips or face now    Negative: Shock suspected (e.g., cold/pale/clammy skin, too weak to stand, low BP, rapid pulse)    Negative: Sounds like a life-threatening emergency to the triager    Negative: [1] COVID-19 exposure AND [2] no symptoms    Negative: COVID-19 vaccine reaction suspected (e.g., fever, headache, muscle aches) occurring 1 to 3 days after getting vaccine    Negative: COVID-19 vaccine, questions about    Negative: [1] Lives with someone known to have influenza (flu test positive) AND [2] flu-like symptoms (e.g., cough, runny nose, sore throat, SOB; with or without fever)    Negative: [1] Adult with possible COVID-19 symptoms AND [2] triager concerned about severity of  symptoms or other causes    Negative: COVID-19 and breastfeeding, questions about    Negative: SEVERE or constant chest pain or pressure (Exception: mild central chest pain, present only when coughing)    Negative: MODERATE difficulty breathing (e.g., speaks in phrases, SOB even at rest, pulse 100-120)    Negative: [1] Headache AND [2] stiff neck (can't touch chin to chest)    Protocols used: CORONAVIRUS (COVID-19) DIAGNOSED OR [id number] 8.25.2021

## 2021-11-22 NOTE — PATIENT INSTRUCTIONS
Dear Rowdy Tan,    Your symptoms show that you may have coronavirus (COVID-19). This illness can cause fever, cough and trouble breathing. Many people get a mild case and get better on their own. Some people can get very sick.    Because you also reported sore throat I would like to also test you for Strep Throat to determine if we need to treat you for that as well.    What should I do?  We would like to test you for Covid-19 virus and Strep Throat. I have placed orders for these tests.   To schedule: go to your [employer] home page and scroll down to the section that says  You have an appointment that needs to be scheduled  and click the large green button that says  Schedule Now  and follow the steps to find the next available openings. It is important that when you are asked what the reason for your appointment is that you mention you need BOTH Covid and Strep tests.    If you are unable to complete these [employer] scheduling steps, please call [phone] to schedule your testing.     Return to work/school/ guidance:   Please let your workplace manager and staffing office know when your quarantine ends     We can t give you an exact date as it depends on the above. You can calculate this on your own or work with your manager/staffing office to calculate this. (For example if you were exposed on 10/4, you would have to quarantine for 14 full days. That would be through 10/18. You could return on 10/19.)      If you receive a positive COVID-19 test result, follow the guidance of the those who are giving you the results. Usually the return to work is 10 (or in some cases 20 days from symptom onset.) If you work at [employer], you must also be cleared by Employee Occupational Health and Safety to return to work.        If you receive a negative COVID-19 test result and did not have a high risk exposure to someone with a known positive COVID-19 test, you can return to work once you're free of  fever for 24 hours without fever-reducing medication and your symptoms are improving or resolved.      If you receive a negative COVID-19 test and If you had a high risk exposure to someone who has tested positive for COVID-19 then you can return to work 14 days after your last contact with the positive individual    Note: If you have ongoing exposure to the covid positive person, this quarantine period may be more than 14 days. (For example, if you are continued to be exposed to your child who tested positive and cannot isolate from them, then the quarantine of 7-14 days can't start until your child is no longer contagious. This is typically 10 days from onset of the child's symptoms. So the total duration may be 17-24 days in this case.)    Sign up for [employer].   We know it's scary to hear that you might have COVID-19. We want to track your symptoms to make sure you're okay over the next 2 weeks. Please look for an email from [employer]--this is a free, online program that we'll use to keep in touch. To sign up, follow the link in the email you will receive. Learn more at [email or web address]    How can I take care of myself?    Get lots of rest. Drink extra fluids (unless a doctor has told you not to)    Take Tylenol (acetaminophen) or ibuprofen for fever or pain. If you have liver or kidney problems, ask your family doctor if it's okay to take Tylenol o ibuprofen    If you have other health problems (like cancer, heart failure, an organ transplant or severe kidney disease): Call your specialty clinic if you don't feel better in the next 2 days.    Know when to call 911. Emergency warning signs include:  o Trouble breathing or shortness of breath  o Pain or pressure in the chest that doesn't go away  o Feeling confused like you haven't felt before, or not being able to wake up  o Bluish-colored lips or face    Where can I get more information?  [facility] - About COVID-19:    [email or web address]    CDC - What to Do If You're Sick:   www.cdc.gov/coronavirus/2019-ncov/about/steps-when-sick.html    November 22, 2021  RE:  Rowdy Tan                                                                                                                  [address] [address]      To whom it may concern:    I evaluated Rowdy Tan on November 22, 2021. Rowdy Tan should be excused from work/school.     They should let their workplace manager and staffing office know when their quarantine ends.    We can not give an exact date as it depends on the information below. They can calculate this on their own or work with their manager/staffing office to calculate this. (For example if they were exposed on 10/04, they would have to quarantine for 14 full days. That would be through 10/18. They could return on 10/19.)    Quarantine Guidelines:      If patient receives a positive COVID-19 test result, they should follow the guidance of those who are giving the results. Usually the return to work is 10 (or in some cases 20 days from symptom onset.) If they work at [facility] [employer], they must be cleared by Employee Occupational Health and Safety to return to work.        If patient receives a negative COVID-19 test result and did not have a high risk exposure to someone with a known positive COVID-19 test, they can return to work once they're free of fever for 24 hours without fever-reducing medication and their symptoms are improving or resolved.      If patient receives a negative COVID-19 test and if they had a high risk exposure to someone who has tested positive for COVID-19 then they can return to work 14 days after their last contact with the positive individual    Note: If there is ongoing exposure to the covid positive person, this quarantine period may be longer than 14 days. (For example, if they are continually exposed to their child, who tested  positive and cannot isolate from them, then the quarantine of 7-14 days can't start until their child is no longer contagious. This is typically 10 days from onset to the child's symptoms. So the total duration may be 17-24 days in this case.)     Sincerely,  Case Harris PA-C

## 2021-11-22 NOTE — ED PROVIDER NOTES
EMERGENCY DEPARTMENT ENCOUNTER      NAME: Rowdy Tan  AGE: 45 year old male  YOB: 1975  MRN: [id number]  EVALUATION DATE & TIME: 11/22/2021 11:29 AM    PCP: Sukhdeep Hatch    ED PROVIDER: Opal Summers PA-C      Chief Complaint   Patient presents with     Covid Concern         FINAL IMPRESSION:  1. Pneumonia due to COVID-19 virus          ED COURSE & MEDICAL DECISION MAKING:    Pertinent Labs & Imaging studies reviewed. (See chart for details)    45 year old male presents to the Emergency Department for evaluation of Covid related symptoms.    Physical exam is remarkable for an ill but nontoxic-appearing male.  Heart and lung sounds are remarkable for diminished breath sounds throughout with crackles in the bases.  Patient also has a frequent, harsh cough.  Tachycardia noted.  Abdomen soft and nontender.  Mucous membranes are dry.  Vital signs initially remarkable for tachycardia which improved with IV fluids, remainder are stable.  He is febrile with temperature of 102.5  F.    CBC is unremarkable with no leukocytosis or anemia.  BMP is unremarkable with no significant electrolyte derangements, normal kidney function.  Magnesium is within normal limits.  Troponin is negative, EKG without acute ischemic changes.  D-dimer is elevated at 0.56, CTA of the chest obtained which shows mild bilateral Covid pneumonia but no evidence of PE.    The patient was given IV fluids, Toradol, Tylenol, Zofran with improvement in his symptoms.  I do not think any further emergent labs or imaging are indicated at this time.  Low cardiac risk with negative troponin and EKG, heart score of 0.  Patient does not require hospitalization at this time, he is hemodynamically stable and does not require oxygen.  Advised patient that his symptoms are likely secondary to his COVID-19 infection, recommend symptom management at home with Tylenol and ibuprofen, honey for cough, and follow-up with his primary care  provider for recheck.  Recommend return to the ED with any new or worsening symptoms.  The patient is agreeable with this treatment plan and verbalized his understanding.    ED Course   11:36 AM Performed my initial history and physical exam. Discussed workup in the emergency department, management of symptoms, and likely disposition. I saw the patient wearing eye protection, N95 and surgical mask, and gloves.    4:02 PM Updated patient with test results. I discussed the plan for discharge with the patient, and patient is agreeable. We discussed supportive cares at home and reasons for return to the ER including new or worsening symptoms - all questions and concerns addressed. Patient to be discharged by RN.    At the conclusion of the encounter I discussed the results of all of the tests and the disposition. The questions were answered. The patient or family acknowledged understanding and was agreeable with the care plan.     Voice recognition software was used in the creation of this note. Any grammatical or nonsensical errors are due to inherent errors with the software and are not the intention of the writer.     MEDICATIONS GIVEN IN THE EMERGENCY:  Medications   0.9% sodium chloride BOLUS (0 mLs Intravenous Stopped 11/22/21 1353)   acetaminophen (TYLENOL) tablet 650 mg (650 mg Oral Given 11/22/21 1201)   ketorolac (TORADOL) injection 15 mg (15 mg Intravenous Given 11/22/21 1209)   ondansetron (ZOFRAN) injection 4 mg (4 mg Intravenous Given 11/22/21 1209)   iopamidol (ISOVUE-370) solution 100 mL (100 mLs Intravenous Given 11/22/21 1505)       NEW PRESCRIPTIONS STARTED AT TODAY'S ER VISIT  New Prescriptions    No medications on file            =================================================================    HPI    Patient information was obtained from: Patient    Use of Intrepreter: N/A         Rowdy Tan is a 45 year old male with no pertinent past medical history who presents to the ED via walk-in  alone for evaluation of Covid concerns.    The patient reports that he has had symptoms of COVID-19 including sore throat, cough, fevers, and body aches for approximately 1 week.  He states that when he woke up today, his symptoms were significantly worse.  He has a cough productive of sputum which he describes as white, no hemoptysis noted.  He is feeling short of breath, he states he cannot get a full breath in.  He also endorses some left-sided substernal chest pain with coughing.  He notes posttussive emesis but no nausea or diarrhea.  He endorses a poor appetite. He denies any melena, hematochezia.  No history of PE or DVT, he denies any cardiac history. Patient received a J&J COVID vaccine in 04/21. He does not smoke.    REVIEW OF SYSTEMS   Review of Systems   Constitutional: Positive for appetite change, chills and fever.   HENT: Positive for congestion and sore throat.    Respiratory: Positive for cough and shortness of breath.    Cardiovascular: Positive for chest pain (With coughing).   Gastrointestinal: Positive for constipation and vomiting (Posttussive). Negative for abdominal pain, blood in stool, diarrhea and nausea.   Genitourinary: Negative for difficulty urinating.   Musculoskeletal: Positive for myalgias.     All other systems reviewed and are negative unless noted in HPI.    PAST MEDICAL HISTORY:  No past medical history on file.    PAST SURGICAL HISTORY:  No past surgical history on file.    CURRENT MEDICATIONS:    amoxicillin-clavulanate (AUGMENTIN) 875-125 MG tablet        ALLERGIES:  Allergies   Allergen Reactions     Aspirin (Tartrazine Only) [Tartrazine] Anaphylaxis       FAMILY HISTORY:  No family history on file.    SOCIAL HISTORY:   Social History     Socioeconomic History     Marital status:      Spouse name: Not on file     Number of children: Not on file     Years of education: Not on file     Highest education level: Not on file   Occupational History     Not on file   Tobacco  "Use     Smoking status: Former Smoker     Types: Cigarettes     Quit date: 1996     Years since quittin.6     Smokeless tobacco: Current User     Types: Chew   Substance and Sexual Activity     Alcohol use: Yes     Alcohol/week: 3.0 - 5.0 standard drinks     Types: 3 - 5 Standard drinks or equivalent per week     Drug use: Not Currently     Sexual activity: Not on file   Other Topics Concern     Not on file   Social History Narrative     Not on file     Social Determinants of Health     Financial Resource Strain: Not on file   Food Insecurity: Not on file   Transportation Needs: Not on file   Physical Activity: Not on file   Stress: Not on file   Social Connections: Not on file   Intimate Partner Violence: Not on file   Housing Stability: Not on file       VITALS:  Patient Vitals for the past 24 hrs:   BP Temp Temp src Pulse Resp SpO2 Height Weight   21 1500 -- -- -- 86 30 94 % -- --   21 1456 -- -- -- 85 22 95 % -- --   21 1455 -- -- -- 84 26 93 % -- --   21 1445 116/75 -- -- 87 19 96 % -- --   21 1430 119/73 -- -- 88 24 95 % -- --   21 1415 115/72 -- -- 88 26 94 % -- --   21 1400 112/70 -- -- 90 23 93 % -- --   21 1345 118/69 -- -- 87 22 93 % -- --   21 1330 116/71 -- -- 95 25 94 % -- --   21 1315 117/69 -- -- 91 24 91 % -- --   21 1300 118/72 -- -- 91 25 91 % -- --   21 1245 126/75 98.9  F (37.2  C) Oral 97 22 91 % -- --   21 1230 126/76 -- -- 99 29 93 % -- --   21 1215 132/83 -- -- 104 11 94 % -- --   21 1200 129/81 -- -- 104 24 92 % -- --   21 1145 -- -- -- 107 -- 95 % -- --   21 1127 131/86 (!) 102.5  F (39.2  C) Oral 117 18 95 % 1.803 m (5' 11\") 95.3 kg (210 lb)       PHYSICAL EXAM    VITAL SIGNS: /75   Pulse 86   Temp 98.9  F (37.2  C) (Oral)   Resp 30   Ht 1.803 m (5' 11\")   Wt 95.3 kg (210 lb)   SpO2 94%   BMI 29.29 kg/m    General Appearance: Ill but nontoxic-appearing; Alert, " cooperative, normal speech and facial symmetry, appears stated age  Head:  Normocephalic, without obvious abnormality, atraumatic  Eyes: Conjunctiva/corneas clear, EOM's intact, no nystagmus, PERRL  ENT: Dry mucous membranes; lips, mucosa, and tongue normal; teeth and gums normal, no pharyngeal inflammation, no dysphonia or difficulty swallowing  Neck:  Supple, symmetrical, trachea midline, no adenopathy; no meningismus or nuchal rigidity  Cardio: Tachycardia; regular rhythm, S1 and S2 normal, no murmur, rub    or gallop, 2+ pulses symmetric in all extremities  Pulm: Diminished breath sounds throughout, crackles in the bases; frequent, harsh cough; no respiratory distress   Abdomen:  Abdomen is soft, non-distended with no tenderness to palpation, rebound tenderness, or guarding.   Extremities: Moves all extremities, no calf tenderness  Neuro: Patient is awake, alert, and responsive to voice. No gross motor weaknesses or sensory loss; moves all extremities.     LAB:  All pertinent labs reviewed and interpreted.  Labs Ordered and Resulted from Time of ED Arrival to Time of ED Departure   D DIMER QUANTITATIVE - Abnormal       Result Value    D-Dimer Quantitative 0.56 (*)    TROPONIN I - Normal    Troponin I 0.04     MAGNESIUM - Normal    Magnesium 1.9     BASIC METABOLIC PANEL - Normal    Sodium 139      Potassium 3.5      Chloride 102      Carbon Dioxide (CO2) 25      Anion Gap 12      Urea Nitrogen 18      Creatinine 1.06      Calcium 8.6      Glucose 92      GFR Estimate 84     CBC WITH PLATELETS AND DIFFERENTIAL    WBC Count 6.7      RBC Count 5.34      Hemoglobin 16.4      Hematocrit 48.5      MCV 91      MCH 30.7      MCHC 33.8      RDW 12.7      Platelet Count 188      % Neutrophils 80      % Lymphocytes 14      % Monocytes 6      % Eosinophils 0      % Basophils 0      % Immature Granulocytes 0      NRBCs per 100 WBC 0      Absolute Neutrophils 5.2      Absolute Lymphocytes 1.0      Absolute Monocytes 0.4       Absolute Eosinophils 0.0      Absolute Basophils 0.0      Absolute Immature Granulocytes 0.0      Absolute NRBCs 0.0         RADIOLOGY:  Reviewed all pertinent imaging. Please see official radiology report.  CT Chest Pulmonary Embolism w Contrast   Final Result   IMPRESSION:   1.  No pulmonary artery embolism.   2.  Mild to moderate bilateral multilobar pulmonary airspace disease likely reflecting COVID 19 pneumonia. No pleural effusion.          EKG:    Performed at: 1152    I have independently reviewed and interpreted the EKG, along with the final read. EKG also reviewed by Dr. Aguilera.    Ventricular rate 104 bpm  NH interval 130 ms  QRS duration 80 ms  QT/QTc 306/402  P-R-T axes 37 25 3    Impression: sinus tachycardia. Otherwise normal. Compared to 4/17/2013, no significant change.      I, Carley Lopez, am serving as a scribe to document services personally performed by Opal Summers PA-C based on my observation and the provider's statements to me. I, Opal Summers PA-C attest that Carley Lopez is acting in a scribe capacity, has observed my performance of the services and has documented them in accordance with my direction.     Opal Summers PA-C  Emergency Medicine  [facility] EMERGENCY ROOM  [address] [facility] [address] [phone]  Dept: [phone]       Opal Summers PA-C  11/22/21 2683

## 2021-11-22 NOTE — DISCHARGE INSTRUCTIONS
You were seen in the emergency department for evaluation of cough.  Your lab work today is overall reassuring with no evidence of significant dehydration or infection in your blood.  Your D-dimer blood test was mildly elevated, the CT scan shows mild bilateral pneumonia in your lungs which is consistent with your known Covid infection, no evidence of blood clots.    You may take Tylenol and ibuprofen for pain/fever, do not exceed 4000 mg of Tylenol per day or 3200 mg ofibuprofen per day.    Take 1 tablespoon of honey with warm water to help with cough.    Follow-up with your primary care provider this week for recheck.  Return to the ER if you develop any new or worsening symptoms like oxygen below 90%, worsening difficulty breathing or chest pain, coughing up blood, passing out, or any other symptoms that concern you.

## 2021-11-22 NOTE — ED TRIAGE NOTES
Patient is was dx with covid on Friday. He does have body aches, cough-white sputum, fever, headache, nausea and fatigue.

## 2022-06-22 ENCOUNTER — NURSE TRIAGE (OUTPATIENT)
Dept: NURSING | Facility: CLINIC | Age: 47
End: 2022-06-22

## 2022-06-23 NOTE — TELEPHONE ENCOUNTER
"Chief Complaint  Hypertension (6 month follow up )    Alexis De Guzman presents to [facility] to refill medications and review recent lab results. No medication side effects are reported. Overall the patient is feeling well.  Labs showed incidental elevation of potassium.  No symptoms regarding this.  Previously elevated thyroid is much improved.  Nocturia symptoms stable.  PSA has shown interval improvement.  Blood pressure and lipids are controlled.          Objective   Vital Signs:   Vitals:    06/28/21 1120   BP: 123/74   Pulse: 63   Resp: 16   Temp: 97 °F (36.1 °C)   TempSrc: Skin   SpO2: 96%   Weight: 76.2 kg (168 lb)   Height: 172.7 cm (67.99\")        Physical Exam  Vitals reviewed.   Constitutional:       General: He is not in acute distress.  Cardiovascular:      Rate and Rhythm: Normal rate and regular rhythm.      Heart sounds: Normal heart sounds.   Pulmonary:      Effort: Pulmonary effort is normal.      Breath sounds: Normal breath sounds.   Neurological:      General: No focal deficit present.      Mental Status: He is alert.   Psychiatric:         Attention and Perception: Attention normal.         Mood and Affect: Mood normal.         Behavior: Behavior normal.          Result Review :                Assessment and Plan    Diagnoses and all orders for this visit:    1. Essential hypertension (Primary)  Assessment & Plan:  The current medical regimen is effective;  continue present plan and medications.        Orders:  -     lisinopril (PRINIVIL,ZESTRIL) 20 MG tablet; Take 1 tablet by mouth Daily for 180 days.  Dispense: 90 tablet; Refill: 1    2. Other hyperlipidemia  Assessment & Plan:  The current medical regimen is effective;  continue present plan and medications.        Orders:  -     atorvastatin (LIPITOR) 10 MG tablet; Take 1 tablet by mouth Every Night for 180 days.  Dispense: 90 tablet; Refill: 1    3. Serum potassium elevated  Comments:  Recheck " Triage Call:    Caller: Patient    For the last day or so has been having intermittent lower abdominal pain on his right side.   He has also had some nausea, but denies fever   In the last hour has had 2-3 hard pinching feelings between umbilicus and hip.     Rating pain 6/10 at its worst.   He also had a very dark black/tarry bowel movement this morning    ED recommended disposition.  Patient verbalized understanding about recommendations and disposition.  Encouraged to call back with any further questions.      Natalia Gutierrez RN on 6/22/2022 at 7:53 PM      COVID 19 Nurse Triage Plan/Patient Instructions    Please be aware that novel coronavirus (COVID-19) may be circulating in the community. If you develop symptoms such as fever, cough, or SOB or if you have concerns about the presence of another infection including coronavirus (COVID-19), please contact your health care provider or visit [email or web address].     Disposition/Instructions    ED Visit recommended. Follow protocol based instructions.     Bring Your Own Device:  Please also bring your smart device(s) (smart phones, tablets, laptops) and their charging cables for your personal use and to communicate with your care team during your visit.    Thank you for taking steps to prevent the spread of this virus.  o Limit your contact with others.  o Wear a simple mask to cover your cough.  o Wash your hands well and often.    Resources    [facility]: About COVID-19: [email or web address]    CDC: What to Do If You're Sick: www.cdc.gov/coronavirus/2019-ncov/about/steps-when-sick.html    CDC: Ending Home Isolation: www.cdc.gov/coronavirus/2019-ncov/hcp/disposition-in-home-patients.html     CDC: Caring for Someone: www.cdc.gov/coronavirus/2019-ncov/if-you-are-sick/care-for-someone.html     [facility]: Interim Guidance for Hospital Discharge to Home: www.health.[facility].[place]/diseases/coronavirus/hcp/hospdischarge.pdf    [facility]  "trials (COVID-19 research studies): [email or web address]     Below are the COVID-19 hotlines at the [place] Department of Health ([facility]). Interpreters are available.   o For health questions: Call 640-231-2614 or 1-928.156.7808 (7 a.m. to 7 p.m.)  o For questions about schools and childcare: Call 790-629-3290 or 1-846.743.6600 (7 a.m. to 7 p.m.)                   Reason for Disposition    Black or tarry bowel movements  (Exception: chronic-unchanged  black-grey bowel movements AND is taking iron pills or Pepto-bismol)    Additional Information    Negative: Shock suspected (e.g., cold/pale/clammy skin, too weak to stand, low BP, rapid pulse)    Negative: Difficult to awaken or acting confused (e.g., disoriented, slurred speech)    Negative: Passed out (i.e., lost consciousness, collapsed and was not responding)    Negative: Sounds like a life-threatening emergency to the triager    Negative: Chest pain    Negative: Pain is mainly in upper abdomen  (if needed ask: \"is it mainly above the belly button?\")    Negative: Followed an abdomen (stomach) injury    Negative: [1] SEVERE pain (e.g., excruciating) AND [2] present > 1 hour    Negative: [1] SEVERE pain AND [2] age > 60    Negative: [1] Vomiting AND [2] contains red blood or black (\"coffee ground\") material  (Exception: few red streaks in vomit that only happened once)    Negative: Blood in bowel movements  (Exception: Blood on surface of BM with constipation)    Protocols used: ABDOMINAL PAIN - MALE-A-AH      " potassium.  Orders:  -     Potassium    4. Elevated TSH  Assessment & Plan:  Interval improvement noted.  Continue to monitor with labs.      5. Nocturia  Assessment & Plan:  Nocturia symptoms are stable. PSA will be monitored with annual labs.            Follow Up   Return in about 25 weeks (around 12/20/2021) for Medicare Wellness and regular visit, 30 min.  Patient was given instructions and counseling regarding his condition or for health maintenance advice. Please see specific information pulled into the AVS if appropriate.

## 2022-09-24 ENCOUNTER — HEALTH MAINTENANCE LETTER (OUTPATIENT)
Age: 47
End: 2022-09-24

## 2023-01-24 ENCOUNTER — OFFICE VISIT (OUTPATIENT)
Dept: FAMILY MEDICINE | Facility: CLINIC | Age: 48
End: 2023-01-24
Payer: COMMERCIAL

## 2023-01-24 VITALS
HEIGHT: 71 IN | TEMPERATURE: 98.3 F | DIASTOLIC BLOOD PRESSURE: 82 MMHG | WEIGHT: 225 LBS | RESPIRATION RATE: 12 BRPM | HEART RATE: 75 BPM | OXYGEN SATURATION: 96 % | BODY MASS INDEX: 31.5 KG/M2 | SYSTOLIC BLOOD PRESSURE: 124 MMHG

## 2023-01-24 DIAGNOSIS — Z12.11 SCREEN FOR COLON CANCER: ICD-10-CM

## 2023-01-24 DIAGNOSIS — R10.32 LLQ ABDOMINAL PAIN: Primary | ICD-10-CM

## 2023-01-24 PROCEDURE — 96127 BRIEF EMOTIONAL/BEHAV ASSMT: CPT | Performed by: NURSE PRACTITIONER

## 2023-01-24 PROCEDURE — 99213 OFFICE O/P EST LOW 20 MIN: CPT | Performed by: NURSE PRACTITIONER

## 2023-01-24 ASSESSMENT — ENCOUNTER SYMPTOMS
FEVER: 0
ABDOMINAL PAIN: 1
RECTAL PAIN: 0
CHILLS: 0
ANAL BLEEDING: 0
ABDOMINAL DISTENTION: 0
CONSTITUTIONAL NEGATIVE: 1
HEMATOCHEZIA: 0
VOMITING: 0
PSYCHIATRIC NEGATIVE: 1
DIARRHEA: 0
CONSTIPATION: 0
RESPIRATORY NEGATIVE: 1
UNEXPECTED WEIGHT CHANGE: 0

## 2023-01-24 ASSESSMENT — PATIENT HEALTH QUESTIONNAIRE - PHQ9
10. IF YOU CHECKED OFF ANY PROBLEMS, HOW DIFFICULT HAVE THESE PROBLEMS MADE IT FOR YOU TO DO YOUR WORK, TAKE CARE OF THINGS AT HOME, OR GET ALONG WITH OTHER PEOPLE: NOT DIFFICULT AT ALL
SUM OF ALL RESPONSES TO PHQ QUESTIONS 1-9: 2
SUM OF ALL RESPONSES TO PHQ QUESTIONS 1-9: 2

## 2023-01-24 NOTE — PROGRESS NOTES
"  Assessment & Plan     Screen for colon cancer    - Colonoscopy Screening  Referral    LLQ abdominal pain    - Adult GI  Referral - Consult Only    - not a clear etiology from what I can view in the chart. Symptoms seem to recur today, so will have him get seen by GI. I had a discussed about warning signs and when to seek the ED. I do not believe he has to be seen today since he overall appears comfortable. There is no guarding, stomach is soft, no fevers, etc. Differentials are infection, kidney stones (less likely since URINARY ANALYSIS normal and not noted on CT), infarction, ischemia of the bowel, twisted bowel, etc.        MED REC REQUIRED  Post Medication Reconciliation Status: discharge medications reconciled, continue medications without change  BMI:   Estimated body mass index is 31.38 kg/m  as calculated from the following:    Height as of this encounter: 1.803 m (5' 11\").    Weight as of this encounter: 102.1 kg (225 lb).   Weight management plan: Discussed healthy diet and exercise guidelines        Please follow up within the week if symptoms do not improve or worsen.     BAY Alexander [facility]   Jameel is a 47 year old, presenting for the following health issues:  RECHECK and Abdominal Pain (CT 01/17/2023, abdominal lower left, started 01/16/2023)    The patient reports that he developed left lower quadrant abdominal pain seven days ago. The pain was to the LLQ and had urgency of urine with radiating pain to left testicle. [facility] and CT Scan noted possible messentary scar or infarct. He stated he felt maybe related to strain in a muscle that possibly rotated that cause the pain. Today, he stated his pain was fine until this AM. He stated he woke up with \"7 out of 10\" pain to the LLQ. The pain was like a pinching pain, same as prior. He typically voids once a night, but didn't last night. He is thinking " "maybe the pain was related from an over-distended bladder. When he voided, the pain did not improve, but alittle bit of relief was noted. He has been drinking a lot of water, voided probably 13 times today. One void caused penile irritation and a radiating pain behind his left thigh (not groin). He has been passing gas okay, no vomiting, no bloating, no fevers, no chills, no blood/tarry stools, or constipation/diarrhea. Last BM was this morning.    He denies trouble breathing, or chest pain. No prior or recent abd trauma. No family HO clots. No kidney stone history. URINARY ANALYSIS was normal and CBC. No colitis history.       HPI           Review of Systems   Constitutional: Negative.  Negative for chills, fever and unexpected weight change.   Respiratory: Negative.    Gastrointestinal: Positive for abdominal pain. Negative for abdominal distention, anal bleeding, constipation, diarrhea, hematochezia, rectal pain and vomiting.   Genitourinary: Negative.    Skin: Negative.    Psychiatric/Behavioral: Negative.             Objective    /82   Pulse 75   Temp 98.3  F (36.8  C) (Oral)   Resp 12   Ht 1.803 m (5' 11\")   Wt 102.1 kg (225 lb)   SpO2 96%   BMI 31.38 kg/m    Body mass index is 31.38 kg/m .  Physical Exam  Vitals and nursing note reviewed.   Constitutional:       Appearance: Normal appearance. He is normal weight.   Cardiovascular:      Rate and Rhythm: Normal rate.   Pulmonary:      Effort: Pulmonary effort is normal.   Abdominal:      General: Abdomen is flat. There is no distension. There are no signs of injury.      Palpations: Abdomen is soft.      Tenderness: There is abdominal tenderness in the left lower quadrant. There is no right CVA tenderness, left CVA tenderness, guarding or rebound. Negative signs include Santana's sign, Rovsing's sign, McBurney's sign, psoas sign and obturator sign.       Musculoskeletal:      Right lower leg: No edema.      Left lower leg: No edema.   Skin:     " General: Skin is warm and dry.      Capillary Refill: Capillary refill takes less than 2 seconds.      Coloration: Skin is not pale.      Findings: No erythema.   Neurological:      General: No focal deficit present.      Mental Status: He is alert and oriented to person, place, and time.   Psychiatric:         Mood and Affect: Mood normal.         Behavior: Behavior normal.         Thought Content: Thought content normal.         Judgment: Judgment normal.            Admission on 11/22/2021, Discharged on 11/22/2021   Component Date Value Ref Range Status     D-Dimer Quantitative 11/22/2021 0.56 (H)  0.00 - 0.50 ug/mL FEU Final     Troponin I 11/22/2021 0.04  0.00 - 0.29 ng/mL Final     Magnesium 11/22/2021 1.9  1.8 - 2.6 mg/dL Final     Systolic Blood Pressure 11/22/2021 135  mmHg Final     Diastolic Blood Pressure 11/22/2021 81  mmHg Final     Ventricular Rate 11/22/2021 104  BPM Final     Atrial Rate 11/22/2021 104  BPM Final     DC Interval 11/22/2021 130  ms Final     QRS Duration 11/22/2021 80  ms Final     QT 11/22/2021 306  ms Final     QTc 11/22/2021 402  ms Final     P Axis 11/22/2021 37  degrees Final     R AXIS 11/22/2021 25  degrees Final     T Axis 11/22/2021 3  degrees Final     Interpretation ECG 11/22/2021    Final                    Value:Sinus tachycardia  Otherwise normal ECG  When compared with ECG of 17-APR-2013 10:53,  No significant change was found  Confirmed by SEE ED PROVIDER NOTE FOR, ECG INTERPRETATION ([id number]),  DAV KAMARA ([id number]) on 11/22/2021 12:23:01 PM       WBC Count 11/22/2021 6.7  4.0 - 11.0 10e3/uL Final     RBC Count 11/22/2021 5.34  4.40 - 5.90 10e6/uL Final     Hemoglobin 11/22/2021 16.4  13.3 - 17.7 g/dL Final     Hematocrit 11/22/2021 48.5  40.0 - 53.0 % Final     MCV 11/22/2021 91  78 - 100 fL Final     MCH 11/22/2021 30.7  26.5 - 33.0 pg Final     MCHC 11/22/2021 33.8  31.5 - 36.5 g/dL Final     RDW 11/22/2021 12.7  10.0 - 15.0 % Final     Platelet Count 11/22/2021  188  150 - 450 10e3/uL Final     % Neutrophils 11/22/2021 80  % Final     % Lymphocytes 11/22/2021 14  % Final     % Monocytes 11/22/2021 6  % Final     % Eosinophils 11/22/2021 0  % Final     % Basophils 11/22/2021 0  % Final     % Immature Granulocytes 11/22/2021 0  % Final     NRBCs per 100 WBC 11/22/2021 0  <1 /100 Final     Absolute Neutrophils 11/22/2021 5.2  1.6 - 8.3 10e3/uL Final     Absolute Lymphocytes 11/22/2021 1.0  0.8 - 5.3 10e3/uL Final     Absolute Monocytes 11/22/2021 0.4  0.0 - 1.3 10e3/uL Final     Absolute Eosinophils 11/22/2021 0.0  0.0 - 0.7 10e3/uL Final     Absolute Basophils 11/22/2021 0.0  0.0 - 0.2 10e3/uL Final     Absolute Immature Granulocytes 11/22/2021 0.0  <=0.0 10e3/uL Final     Absolute NRBCs 11/22/2021 0.0  10e3/uL Final     Sodium 11/22/2021 139  136 - 145 mmol/L Final     Potassium 11/22/2021 3.5  3.5 - 5.0 mmol/L Final     Chloride 11/22/2021 102  98 - 107 mmol/L Final     Carbon Dioxide (CO2) 11/22/2021 25  22 - 31 mmol/L Final     Anion Gap 11/22/2021 12  5 - 18 mmol/L Final     Urea Nitrogen 11/22/2021 18  8 - 22 mg/dL Final     Creatinine 11/22/2021 1.06  0.70 - 1.30 mg/dL Final     Calcium 11/22/2021 8.6  8.5 - 10.5 mg/dL Final     Glucose 11/22/2021 92  70 - 125 mg/dL Final     GFR Estimate 11/22/2021 84  >60 mL/min/1.73m2 Final    As of July 11, 2021, eGFR is calculated by the CKD-EPI creatinine equation, without race adjustment. eGFR can be influenced by muscle mass, exercise, and diet. The reported eGFR is an estimation only and is only applicable if the renal function is stable.                   Answers for HPI/ROS submitted by the patient on 1/24/2023  If you checked off any problems, how difficult have these problems made it for you to do your work, take care of things at home, or get along with other people?: Not difficult at all  PHQ9 TOTAL SCORE: 2

## 2023-01-29 ENCOUNTER — HEALTH MAINTENANCE LETTER (OUTPATIENT)
Age: 48
End: 2023-01-29

## 2023-02-22 NOTE — TELEPHONE ENCOUNTER
REFERRAL INFORMATION:    Referring Provider: Natalia Williamson NP    Referring Clinic: [facility] - [facility]    Reason for Visit/Diagnosis: LLQ Abdominal Pain     FUTURE VISIT INFORMATION:    Appointment Date: 04/24/2023    Appointment Time:  1PM     NOTES STATUS DETAILS   OFFICE NOTE from Referring Provider Internal [facility]:  1/24/23 - [facility] OV with Natalia Williamson NP   OFFICE NOTE from Other Specialist N/A    HOSPITAL DISCHARGE SUMMARY/  ED VISITS In process [facility]:  1/17/23 - ED OV with SOPHIE Galindo   OPERATIVE REPORT N/A    MEDICATION LIST Internal         ENDOSCOPY  N/A    COLONOSCOPY N/A    ERCP N/A    EUS N/A    STOOL TESTING N/A    PERTINENT LABS N/A    PATHOLOGY REPORTS (RELATED) N/A    IMAGING (CT, MRI, EGD, MRCP, Small Bowel Follow Through/SBT, MR/CT Enterography) In process / Internal [facility]:  1/17/23 - CT Abd/Pelvis    [facility]:  11/22/21 - CT Chest     Records Requested    Facility  [facility]  Fax: [phone]   Outcome * 2/22/23 6:37 AM Faxed req to [facility] for images to be pushed to [facility]. - Yessica    03-15-23 sent 2nd request/records [facility] @ 11:05am    03-17-23 images recvd

## 2023-04-06 ENCOUNTER — E-VISIT (OUTPATIENT)
Dept: URGENT CARE | Facility: CLINIC | Age: 48
End: 2023-04-06
Payer: COMMERCIAL

## 2023-04-06 DIAGNOSIS — U07.1 INFECTION DUE TO 2019 NOVEL CORONAVIRUS: Primary | ICD-10-CM

## 2023-04-06 PROCEDURE — 99207 PR NO CHARGE LOS: CPT | Performed by: NURSE PRACTITIONER

## 2023-04-06 NOTE — PATIENT INSTRUCTIONS
Thank you for choosing us for your care. Please make a virtual urgent care visit to discuss treatment options for COVID. You can do so through my chart or calliing [phone]  No charge for this e-visit    [facility]

## 2023-04-10 ENCOUNTER — TELEPHONE (OUTPATIENT)
Dept: GASTROENTEROLOGY | Facility: CLINIC | Age: 48
End: 2023-04-10
Payer: COMMERCIAL

## 2023-04-10 NOTE — TELEPHONE ENCOUNTER
Called Pt to help get them scheduled for a sooner GI appointment. Pt is now rescheduled to see Amirah Barragan on 4/24/2023 at 1pm for a video visit.

## 2023-04-24 ENCOUNTER — VIRTUAL VISIT (OUTPATIENT)
Dept: GASTROENTEROLOGY | Facility: CLINIC | Age: 48
End: 2023-04-24
Attending: NURSE PRACTITIONER
Payer: COMMERCIAL

## 2023-04-24 VITALS — BODY MASS INDEX: 29.29 KG/M2 | WEIGHT: 210 LBS

## 2023-04-24 DIAGNOSIS — R10.32 LLQ ABDOMINAL PAIN: ICD-10-CM

## 2023-04-24 PROCEDURE — 99417 PROLNG OP E/M EACH 15 MIN: CPT | Performed by: DIETITIAN, REGISTERED

## 2023-04-24 PROCEDURE — 99205 OFFICE O/P NEW HI 60 MIN: CPT | Mod: VID | Performed by: DIETITIAN, REGISTERED

## 2023-04-24 ASSESSMENT — PAIN SCALES - GENERAL: PAINLEVEL: NO PAIN (0)

## 2023-04-24 NOTE — NURSING NOTE
Is the patient currently in the state of [place]? YES    Visit mode:VIDEO    If the visit is dropped, the patient can be reconnected by: VIDEO VISIT: Text to cell phone: [phone]    Will anyone else be joining the visit? NO      How would you like to obtain your AVS? MyChart    Are changes needed to the allergy or medication list? NO    Reason for visit: Video Visit (Abdominal Pain)

## 2023-04-24 NOTE — PATIENT INSTRUCTIONS
It was a pleasure taking care of you today.  I've included a brief summary of our discussion and care plan from today's visit below.  Please review this information with your primary care provider.  ______________________________________________________________________    My recommendations are summarized as follows:  -- Schedule colonoscopy and upper endoscopy  -- Work on stopping chewing tobacco use.   -- I will let you know if we need any more imaging  -- Let us know if your symptoms change or worsen  -- please see scheduling information provided below     Return to GI Clinic in 3 months to review your progress (after colonoscopy/upper endoscopy).    ______________________________________________________________________    How do I schedule labs, imaging studies, or procedures that were ordered in clinic today?     Labs: To schedule lab appointment at the [facility], use my chart or call [phone]. If you have a [facility] lab closer to home where you are regularly seen you can give them a call.     Procedures: If a colonoscopy, upper endoscopy, breath test, esophageal manometry, or pH impedence was ordered today, our endoscopy team will call you to schedule this. If you have not heard from our endoscopy team within a week, please call [phone] to schedule.     Imaging Studies: If you were scheduled for a CT scan, X-ray, MRI, ultrasound, HIDA scan or other imaging study, please call [phone] to have this scheduled.     Referral: If a referral to another specialty was ordered, expect a phone call or follow instructions above. If you have not heard from anyone regarding your referral in a week, please call our clinic to check the status.     Who do I call with any questions after my visit?  Please be in touch if there are any further questions that arise following today's visit.  There are multiple ways to contact your gastroenterology care team.      During business hours, you may reach a  Gastroenterology nurse at [phone]    To schedule or reschedule an appointment, please call [phone].     You can always send a secure message through Company.  Company messages are answered by your nurse or doctor typically within 24 hours.  Please allow extra time on weekends and holidays.      For urgent/emergent questions after business hours, you may reach the on-call GI Fellow by contacting the [facility] at [phone].     How will I get the results of any tests ordered?    You will receive all of your results.  If you have signed up for [employer], any tests ordered at your visit will be available to you after your provider reviews them.  Typically this takes 1-2 weeks.  If there are urgent results that require a change in your care plan, your provider or nurse will call you to discuss the next steps.      What is Company?  Company is a secure way for you to access all of your healthcare records from the [facility].  It is a web based computer program, so you can sign on to it from any location.  It also allows you to send secure messages to your care team.  I recommend signing up for Company access if you have not already done so and are comfortable with using a computer.      How to I schedule a follow-up visit?  If you did not schedule a follow-up visit today, please call [phone] to schedule a follow-up office visit.      Sincerely,    Amirah Barragan PA-C  Division of Gastroenterology, Hepatology & Nutrition  [facility]

## 2023-04-24 NOTE — PROGRESS NOTES
GI CLINIC VISIT    CC/REFERRING MD:  Natalia Williamson  REASON FOR CONSULTATION: LLQ Abdominal Pain    ASSESSMENT/PLAN:  #LLQ Abdominal Pain  #Possible melena  Patient with acute onset of LLQ abdominal pain in January while vacationing, onset when rolling over in bed at night. No precipitating factors that he can appreciate other than eating oysters, maybe ingested a little shell. He had several episodes of sharp pain within first week of onset, but since has been dull and intermitent. Has noted radiation to left testicle though not recently. Pain seems to be associated with movement and full bladder. CT A/P in urgency room during acute pain when returned showed normal colon and small bowel with small focus of nonmarginated mesenteric stranding in the left upper pelvis (15 x 15 mm), finding felt to be related to scar, inflammation or infarct. No CTA performed as vessels patent on imaging. Labs (BMP and CBC) and UA largely unremarkable. It is also notable that he has had intermittent dark sticky maroon stools over past ~6 months (prior to onset of pain) concerning for melena. No hematochezia. No acute changes in bowel pattern when experiencing pain. He does chew tobacco daily, no NSAID use or risk factors for GI bleed and no epigastric pain or reflux type symptoms.Reassuring that Hgb was wnl at urgency room visit with at least month of intermitent marroon stools. It is reassuring pain has overall improved with no recurrence of sharp/severe pain in past 3 months.  Overall unclear etiology of LLQ pain, possible mesenteric infarct (if bowel ischemic could explain intermittent ?melena), inflammation or scar as noted. CT did show mild tenting of left side of bladder and he does not worsening pain with full bladder. Possible abdominal wall pain with association with movement. No evidence of diverticulitis on CT. For further evaluation of pain and possible melena we will pursue EGD and colonoscopy (due for CRC creening as  "well). Consider CTA, MRE and/or capsule endoscopy pending findings. He reports pain is manageable now, but could consider dicyclomine in future to see if this helps. Recommended he present to ED with acute severe pain or blood in stool or overt  melena/more than one maroon stool.    -- Schedule colonoscopy and upper endoscopy  -- Work on stopping chewing tobacco use.     Colorectal cancer screening: due now, age > 45. No family history of CRC.    RTC 2-3 months (after EGD and colonoscopy)    Thank you for this consultation.  It was a pleasure to participate in the care of this patient; please contact us with any further questions.     80 Minutes was spent on the date of the encounter during chart review, history and exam, documentation, and further activities as noted       Amirah Barragan PA-C, CORINNA  Division of Gastroenterology, Hepatology & Nutrition  [facility]        AURELIA  Rowdy \"Jameel\" Dominick is a 47 year old male with no significant past medical history who presents for evaluation of LLQ pain. They are new to the [facility] GI clinic and this is my first encounter with the patient.     Jameel reports that he was on vacation in [place] in mid-January when he rolled over in bed and felt sharp, pinching LLQ pain - about 1 inch to the left of umbilicus and 1 inch down. He was able to fall back asleep, but then had recurrence of sharp pain in same location when showering again in the morning. Pain subsided and was able to enjoy the following two days of vacation but when he returned back to [place], he again had recurrence of sharp LLQ pain so went to urgency room for further evaluation. At this time had some radiation to left testicle and increased urinary frequency.  Labs checked in urgency room - BMP and UA unremarkable. CBC with normal WBC and Hgb,  MCV elevated to 99. CT A/P done which showed small focus of nonmarginated mesenteric stranding in the left upper pelvis (15 x 15 mm) suggesting mild mesenteric scar or " "acute inflammation, possibly due to infarct. Per ER provider discussion with radiologist, highest suspicion for infarct but no indication for CTA as arteries appeared patent.     He then established with PCP a few days later for further evaluation. He had dull pain in LLQ at that time. Referral sent for GI and colonoscopy.    Today he reports that since January he has not had recurrence of sharp LLQ but does continue to have dull achy type pain in same location without radiation - comes on about once every 1-2 weeks. He reports this is worse when bladder is full or when performing certain movements (such as touching right hand to left foot or golfing. Denies anything different on vacation other than eating oysters, wonders if he ingested part of a shell. No other illness or exposures or activities that would have strained muscle. Denies associated nausea, vomiting, bloating, or change in bowel pattern at times of pain.    Of note bowel pattern did seem to change over past 6 months or so (prior to onset of abdominal pain). He reports that he is typically quite regular with one formed, easy to pass bowel movement per day, he continues on this daily pattern with one stool per day but consistency has changed - anywhere from formed to loose (not typically hard). He does report dark maroon stools 1-2 weeks, sticky and harder to wipe. No sebastian blood. Notes that stools smell differently , describes as \"campfire\" particularly with maroon stools. Denies urgency, no noctural stools, no tenesmus. Denies straining. Feels completely evacuated after bowel movements.    No reflux or heartburn. No epigastric pain. No dysphagia or odynophagia. Infrequent use of NSAIDs. Does use chewing tobacco daily. Etoh, few beers per week. No nausea or vomiting. No bloating. No gas. No belching.    No weight loss, fevers, chills, or other constitutional sx.      ROS:    No fevers or chills  No weight loss  No blurry vision, double vision or change " in vision  No sore throat  No lymphadenopathy  No headache, paraesthesias, or weakness in a limb  No shortness of breath or wheezing  No chest pain or pressure  No arthralgias or myalgias  No rashes or skin changes  No odynophagia or dysphagia  No BRBPR, hematochezia, melena  No dysuria, frequency or urgency  No hot/cold intolerance or polyria  No anxiety or depression      PROBLEM LIST  Patient Active Problem List    Diagnosis Date Noted     Lump on neck 2018     Priority: Medium     Streptococcal Sore Throat      Priority: Medium     Created by Conversion       Lipoma on neck    PERTINENT PAST MEDICAL HISTORY:  No past medical history on file.    PREVIOUS SURGERIES:  No past surgical history on file.  R knee surgery    PREVIOUS ENDOSCOPY:  None    ALLERGIES:    Allergies   Allergen Reactions     Aspirin Anaphylaxis     Aspirin (Tartrazine Only) [Tartrazine] Anaphylaxis       PERTINENT MEDICATIONS:  No current outpatient medications on file.  No other NSAID/anticoagulation reported by patient.  No other OTC/herbal/supplements reported by patient.    SOCIAL HISTORY:  Tobacco: chew daily  Alcohol: 1-2 times per week, couple beers  Drugs Use: no    Social History     Socioeconomic History     Marital status:      Spouse name: Not on file     Number of children: Not on file     Years of education: Not on file     Highest education level: Not on file   Occupational History     Not on file   Tobacco Use     Smoking status: Former     Types: Cigarettes     Quit date: 1996     Years since quittin.0     Smokeless tobacco: Current     Types: Chew   Vaping Use     Vaping status: Not on file   Substance and Sexual Activity     Alcohol use: Yes     Alcohol/week: 3.0 - 5.0 standard drinks of alcohol     Types: 3 - 5 Standard drinks or equivalent per week     Drug use: Not Currently     Sexual activity: Not on file   Other Topics Concern     Not on file   Social History Narrative     Not on file     Social  Determinants of Health     Financial Resource Strain: Not on file   Food Insecurity: Not on file   Transportation Needs: Not on file   Physical Activity: Not on file   Stress: Not on file   Social Connections: Not on file   Intimate Partner Violence: Not on file   Housing Stability: Not on file       FAMILY HISTORY:  FH of CRC: no  FH of IBD: no  No Colon/Panc/Esophageal/other GI CA. No IBD or Celiac Disease. No other Autoimmune, Liver, or Thyroid disease.    No family history on file.    Past/family/social history reviewed and no changes    PHYSICAL EXAMINATION:  Constitutional: AAOx3, cooperative, pleasant, not dyspneic/diaphoretic, no acute distress  Vitals reviewed: There were no vitals taken for this visit.  Wt:   Wt Readings from Last 2 Encounters:   01/24/23 102.1 kg (225 lb)   11/22/21 95.3 kg (210 lb)   General appearance: Healthy appearing adult, in no acute distress  Eyes: Sclera anicteric, Pupils round and reactive to light  Ears, nose, mouth and throat: No obvious external lesions of ears and nose, Hearing intact  Neck: Symmetric, No obvious external lesions  Respiratory: Normal respiration, no use of accessory muscles   MSK: Gait normal  Skin: No rashes or jaundice   Psychiatric: Oriented to person, place and time, Appropriate mood and affect.     PERTINENT STUDIES:  Lab on 11/19/2021   Component Date Value Ref Range Status     SARS CoV2 PCR 11/19/2021 Positive (A)  Negative, Testing sent to reference lab. Results will be returned via unsolicited result Final       CT A/P 01/17/23:  INDICATION: LLQ abdominal pain   Concern for diverticulitis   COMPARISON: None.   TECHNIQUE: CT scan of the abdomen and pelvis was performed following injection of IV contrast. Multiplanar reformats were obtained. Dose reduction techniques were used.   CONTRAST: IOPAMIDOL 300 MG/ML  ML BOTTLE: 100mL     FINDINGS:   LOWER CHEST: Normal.     HEPATOBILIARY: Normal.     PANCREAS: Normal.     SPLEEN: Normal.     ADRENAL  GLANDS: Normal.     KIDNEYS/BLADDER: No significant mass, stone, or hydronephrosis.     BOWEL: The colon, small bowel, and appendix appear normal.     There is a small focus of nonmarginated mesenteric stranding in the left upper pelvis. It measures 15 x 15 mm (series 2, image 171). The gonadal vein and adjacent ureter appear normal. There is slight upward tenting of the left side bladder.     This finding suggests mild mesenteric scar or acute inflammation, possibly due to infarct.     LYMPH NODES: Normal.     VASCULATURE: Patent mesenteric and renal arteries and veins.     PELVIC ORGANS: Normal.     MUSCULOSKELETAL: Fat-containing right inguinal hernia. Lumbosacral disc degeneration.     IMPRESSION:   1.  Small focus of left lower quadrant mesenteric stranding. This could represent acute mesenteric infarct, or scar.   2.  Fat-containing right inguinal hernia.

## 2023-04-24 NOTE — LETTER
4/24/2023         RE: Rowdy Tan  [address] [facility] [id number]        Dear Colleague,    Thank you for referring your patient, Rowdy Tan, to the [facility] [place]. Please see a copy of my visit note below.    GI CLINIC VISIT    CC/REFERRING MD:  Natalia Williamson  REASON FOR CONSULTATION: LLQ Abdominal Pain    ASSESSMENT/PLAN:  #LLQ Abdominal Pain  #Possible melena  Patient with acute onset of LLQ abdominal pain in January while vacationing, onset when rolling over in bed at night. No precipitating factors that he can appreciate other than eating oysters, maybe ingested a little shell. He had several episodes of sharp pain within first week of onset, but since has been dull and intermitent. Has noted radiation to left testicle though not recently. Pain seems to be associated with movement and full bladder. CT A/P in urgency room during acute pain when returned showed normal colon and small bowel with small focus of nonmarginated mesenteric stranding in the left upper pelvis (15 x 15 mm), finding felt to be related to scar, inflammation or infarct. No CTA performed as vessels patent on imaging. Labs (BMP and CBC) and UA largely unremarkable. It is also notable that he has had intermittent dark sticky maroon stools over past ~6 months (prior to onset of pain) concerning for melena. No hematochezia. No acute changes in bowel pattern when experiencing pain. He does chew tobacco daily, no NSAID use or risk factors for GI bleed and no epigastric pain or reflux type symptoms.Reassuring that Hgb was wnl at urgency room visit with at least month of intermitent marroon stools. It is reassuring pain has overall improved with no recurrence of sharp/severe pain in past 3 months.  Overall unclear etiology of LLQ pain, possible mesenteric infarct (if bowel ischemic could explain intermittent ?melena), inflammation or scar as noted. CT did show mild tenting of left  "side of bladder and he does not worsening pain with full bladder. Possible abdominal wall pain with association with movement. No evidence of diverticulitis on CT. For further evaluation of pain and possible melena we will pursue EGD and colonoscopy (due for CRC creening as well). Consider CTA, MRE and/or capsule endoscopy pending findings. He reports pain is manageable now, but could consider dicyclomine in future to see if this helps. Recommended he present to ED with acute severe pain or blood in stool or overt  melena/more than one maroon stool.    -- Schedule colonoscopy and upper endoscopy  -- Work on stopping chewing tobacco use.     Colorectal cancer screening: due now, age > 45. No family history of CRC.    RTC 2-3 months (after EGD and colonoscopy)    Thank you for this consultation.  It was a pleasure to participate in the care of this patient; please contact us with any further questions.     80 Minutes was spent on the date of the encounter during chart review, history and exam, documentation, and further activities as noted       Amirah Barragan PA-C, RD  Division of Gastroenterology, Hepatology & Nutrition  [facility]        HPI  Rowdy \"Jameel\" Dominick is a 47 year old male with no significant past medical history who presents for evaluation of LLQ pain. They are new to the [facility] GI clinic and this is my first encounter with the patient.     Jameel reports that he was on vacation in [place] in mid-January when he rolled over in bed and felt sharp, pinching LLQ pain - about 1 inch to the left of umbilicus and 1 inch down. He was able to fall back asleep, but then had recurrence of sharp pain in same location when showering again in the morning. Pain subsided and was able to enjoy the following two days of vacation but when he returned back to [place], he again had recurrence of sharp LLQ pain so went to urgency room for further evaluation. At this time had some radiation to left testicle and " "increased urinary frequency.  Labs checked in urgency room - BMP and UA unremarkable. CBC with normal WBC and Hgb,  MCV elevated to 99. CT A/P done which showed small focus of nonmarginated mesenteric stranding in the left upper pelvis (15 x 15 mm) suggesting mild mesenteric scar or acute inflammation, possibly due to infarct. Per ER provider discussion with radiologist, highest suspicion for infarct but no indication for CTA as arteries appeared patent.     He then established with PCP a few days later for further evaluation. He had dull pain in LLQ at that time. Referral sent for GI and colonoscopy.    Today he reports that since January he has not had recurrence of sharp LLQ but does continue to have dull achy type pain in same location without radiation - comes on about once every 1-2 weeks. He reports this is worse when bladder is full or when performing certain movements (such as touching right hand to left foot or golfing. Denies anything different on vacation other than eating oysters, wonders if he ingested part of a shell. No other illness or exposures or activities that would have strained muscle. Denies associated nausea, vomiting, bloating, or change in bowel pattern at times of pain.    Of note bowel pattern did seem to change over past 6 months or so (prior to onset of abdominal pain). He reports that he is typically quite regular with one formed, easy to pass bowel movement per day, he continues on this daily pattern with one stool per day but consistency has changed - anywhere from formed to loose (not typically hard). He does report dark maroon stools 1-2 weeks, sticky and harder to wipe. No sebastian blood. Notes that stools smell differently , describes as \"campfire\" particularly with maroon stools. Denies urgency, no noctural stools, no tenesmus. Denies straining. Feels completely evacuated after bowel movements.    No reflux or heartburn. No epigastric pain. No dysphagia or odynophagia. Infrequent " use of NSAIDs. Does use chewing tobacco daily. Etoh, few beers per week. No nausea or vomiting. No bloating. No gas. No belching.    No weight loss, fevers, chills, or other constitutional sx.      ROS:    No fevers or chills  No weight loss  No blurry vision, double vision or change in vision  No sore throat  No lymphadenopathy  No headache, paraesthesias, or weakness in a limb  No shortness of breath or wheezing  No chest pain or pressure  No arthralgias or myalgias  No rashes or skin changes  No odynophagia or dysphagia  No BRBPR, hematochezia, melena  No dysuria, frequency or urgency  No hot/cold intolerance or polyria  No anxiety or depression      PROBLEM LIST  Patient Active Problem List    Diagnosis Date Noted    Lump on neck 2018     Priority: Medium    Streptococcal Sore Throat      Priority: Medium     Created by Conversion       Lipoma on neck    PERTINENT PAST MEDICAL HISTORY:  No past medical history on file.    PREVIOUS SURGERIES:  No past surgical history on file.  R knee surgery    PREVIOUS ENDOSCOPY:  None    ALLERGIES:    Allergies   Allergen Reactions    Aspirin Anaphylaxis    Aspirin (Tartrazine Only) [Tartrazine] Anaphylaxis       PERTINENT MEDICATIONS:  No current outpatient medications on file.  No other NSAID/anticoagulation reported by patient.  No other OTC/herbal/supplements reported by patient.    SOCIAL HISTORY:  Tobacco: chew daily  Alcohol: 1-2 times per week, couple beers  Drugs Use: no    Social History     Socioeconomic History    Marital status:      Spouse name: Not on file    Number of children: Not on file    Years of education: Not on file    Highest education level: Not on file   Occupational History    Not on file   Tobacco Use    Smoking status: Former     Types: Cigarettes     Quit date: 1996     Years since quittin.0    Smokeless tobacco: Current     Types: Chew   Vaping Use    Vaping status: Not on file   Substance and Sexual Activity    Alcohol  use: Yes     Alcohol/week: 3.0 - 5.0 standard drinks of alcohol     Types: 3 - 5 Standard drinks or equivalent per week    Drug use: Not Currently    Sexual activity: Not on file   Other Topics Concern    Not on file   Social History Narrative    Not on file     Social Determinants of Health     Financial Resource Strain: Not on file   Food Insecurity: Not on file   Transportation Needs: Not on file   Physical Activity: Not on file   Stress: Not on file   Social Connections: Not on file   Intimate Partner Violence: Not on file   Housing Stability: Not on file       FAMILY HISTORY:  FH of CRC: no  FH of IBD: no  No Colon/Panc/Esophageal/other GI CA. No IBD or Celiac Disease. No other Autoimmune, Liver, or Thyroid disease.    No family history on file.    Past/family/social history reviewed and no changes    PHYSICAL EXAMINATION:  Constitutional: AAOx3, cooperative, pleasant, not dyspneic/diaphoretic, no acute distress  Vitals reviewed: There were no vitals taken for this visit.  Wt:   Wt Readings from Last 2 Encounters:   01/24/23 102.1 kg (225 lb)   11/22/21 95.3 kg (210 lb)   General appearance: Healthy appearing adult, in no acute distress  Eyes: Sclera anicteric, Pupils round and reactive to light  Ears, nose, mouth and throat: No obvious external lesions of ears and nose, Hearing intact  Neck: Symmetric, No obvious external lesions  Respiratory: Normal respiration, no use of accessory muscles   MSK: Gait normal  Skin: No rashes or jaundice   Psychiatric: Oriented to person, place and time, Appropriate mood and affect.     PERTINENT STUDIES:  Lab on 11/19/2021   Component Date Value Ref Range Status    SARS CoV2 PCR 11/19/2021 Positive (A)  Negative, Testing sent to reference lab. Results will be returned via unsolicited result Final       CT A/P 01/17/23:  INDICATION: LLQ abdominal pain   Concern for diverticulitis   COMPARISON: None.   TECHNIQUE: CT scan of the abdomen and pelvis was performed following  injection of IV contrast. Multiplanar reformats were obtained. Dose reduction techniques were used.   CONTRAST: IOPAMIDOL 300 MG/ML  ML BOTTLE: 100mL     FINDINGS:   LOWER CHEST: Normal.     HEPATOBILIARY: Normal.     PANCREAS: Normal.     SPLEEN: Normal.     ADRENAL GLANDS: Normal.     KIDNEYS/BLADDER: No significant mass, stone, or hydronephrosis.     BOWEL: The colon, small bowel, and appendix appear normal.     There is a small focus of nonmarginated mesenteric stranding in the left upper pelvis. It measures 15 x 15 mm (series 2, image 171). The gonadal vein and adjacent ureter appear normal. There is slight upward tenting of the left side bladder.     This finding suggests mild mesenteric scar or acute inflammation, possibly due to infarct.     LYMPH NODES: Normal.     VASCULATURE: Patent mesenteric and renal arteries and veins.     PELVIC ORGANS: Normal.     MUSCULOSKELETAL: Fat-containing right inguinal hernia. Lumbosacral disc degeneration.     IMPRESSION:   1.  Small focus of left lower quadrant mesenteric stranding. This could represent acute mesenteric infarct, or scar.   2.  Fat-containing right inguinal hernia.           Again, thank you for allowing me to participate in the care of your patient.      Sincerely,    Amirah Barragan PA-C

## 2023-04-24 NOTE — PROGRESS NOTES
Virtual Visit Details    Type of service:  Video Visit     Originating Location (pt. Location): Home    Distant Location (provider location):  Off-site  Platform used for Video Visit: Fatuma

## 2023-04-27 ENCOUNTER — TELEPHONE (OUTPATIENT)
Dept: GASTROENTEROLOGY | Facility: CLINIC | Age: 48
End: 2023-04-27
Payer: COMMERCIAL

## 2023-04-27 NOTE — TELEPHONE ENCOUNTER
Screening Questions  BLUE  KIND OF PREP RED  LOCATION [review exclusion criteria] GREEN  SEDATION TYPE        y Are you active on mychart?       [place] Ordering/Referring Provider?        BCBS What type of coverage do you have?      n Have you had a positive covid test in the last 14 days?     28.5 1. BMI  [BMI 40+ - review exclusion criteria& smart-phrase document]    y  2. Are you able to give consent for your medical care? [IF NO,RN REVIEW]          n  3. Are you taking any prescription pain medications on a routine schedule   (ex narcotics: oxycodone, roxicodone, oxycontin,  and percocet)? [RN Review]        n  3a. EXTENDED PREP What kind of prescription?     n 4. Do you have any chemical dependencies such as alcohol, street drugs, or methadone?        **If yes 3- 5 , please schedule with MAC sedation.**          IF YES TO ANY 6 - 10 - HOSPITAL SETTING ONLY.     n 6.   Do you need assistance transferring?     n 7.   Have you had a heart or lung transplant?    n 8.   Are you currently on dialysis?   n 9.   Do you use daily home oxygen?   n 10. Do you take nitroglycerin?   10a. n If yes, how often?     11. [FEMALES]   Are you currently pregnant?    11a.  If yes, how many weeks? [ Greater than 12 weeks, OR NEEDED]    n 12. Do you have Pulmonary Hypertension? *NEED PAC APPT AT [facility] w/ MAC*     n 13. [review exclusion criteria]  Do you have any implantable devices in your body (pacemaker, defib, LVAD)?    n 14. In the past 6 months, have you had any heart related issues including cardiomyopathy or heart attack?     14a. n If yes, did it require cardiac stenting if so when?     n 15. Have you had a stroke or Transient ischemic attack (TIA - aka  mini stroke ) within 6 months?      n 16. Do you have mod to severe Obstructive Sleep Apnea?  [Hospital only]    n 17. Do you have SEVERE AND UNCONTROLLED asthma? *NEED PAC APPT AT [facility] w/MAC*     18. Are you currently taking any blood thinners?     18a. No. Continue to  "19.   18b.     n 19. Do you take the medication Phentermine?    19a. If yes, \"Hold for 7 days before procedure.  Please consult your prescribing provider if you have questions about holding this medication.\"     n  20. Do you have chronic kidney disease?      n  21. Do you have a diagnosis of diabetes?     n  22. On a regular basis do you go 3-5 days between bowel movements?      23. Preferred LOCAL Pharmacy for Pre Prescription    [ LIST ONLY ONE PHARMACY]     [facility] [id number] [place] [address]        - CLOSING REMINDERS -    Informed patient they will need an adult    Cannot take any type of public or medical transportation alone    Conscious Sedation- Needs  for 6 hours after the procedure       MAC/General-Needs  for 24 hours after procedure    Pre-Procedure Covid test to be completed [[facility] PCR Testing Required]    Confirmed Nurse will call to complete assessment       - SCHEDULING DETAILS -  n Hospital Setting Required? If yes, what is the exclusion?:    Martha  Surgeon    7/6  Date of Procedure  Upper and Lower Endoscopy [EGD and Colonoscopy]  Type of Procedure Scheduled  [facility] Location   MIRALAX GATORADE WITHOUT MAGNEISUM CITRATE Which Colonoscopy Prep was Sent?     CS Sedation Type     n PAC / Pre-op Required                 "

## 2023-05-23 ENCOUNTER — PRE VISIT (OUTPATIENT)
Dept: GASTROENTEROLOGY | Facility: CLINIC | Age: 48
End: 2023-05-23

## 2023-05-31 ENCOUNTER — TELEPHONE (OUTPATIENT)
Dept: GASTROENTEROLOGY | Facility: CLINIC | Age: 48
End: 2023-05-31
Payer: COMMERCIAL

## 2023-05-31 NOTE — TELEPHONE ENCOUNTER
Caller: Rowdy Tan    Reason for Reschedule/Cancellation (please be detailed, any staff messages or encounters to note?):     HUSEYIN MASON       Prior to reschedule please review:    Ordering Provider:Amirah Barragan PA-C    Sedation per order:MAC     Does patient have any ASC Exclusions, please identify?: N       Notes on Cancelled Procedure:  1. Procedure:Upper and Lower Endoscopy [EGD and Colonoscopy]   2. Date: 07/06/2023  3. Location:[facility] [place]; [address] [place] [address], [address] Floor, [place] [address]  4. Surgeon: HUSEYIN        Rescheduled: NO - LVMTCB - AWAITING PT CALL BACK TO R/S    **SENT MYCHART R/S MSG   **(MAC) PER ORDER   **(DOUBLE)    **CASE IN [facility]

## 2023-05-31 NOTE — TELEPHONE ENCOUNTER
CALLER: Rowdy Tan    Rescheduled: Yes    Procedure: Upper and Lower Endoscopy [EGD and Colonoscopy]     Date: 08/16/2023    [facility]; [address] [place] [address], 5[address] Floor, [place] [address]    Surgeon:     Sedation Level Scheduled  MAC,  Reason for Sedation Level NEXT AVAILABLE     Prep/Instructions updated and sent: SENT VIA Kambit

## 2023-07-10 ENCOUNTER — HOSPITAL ENCOUNTER (EMERGENCY)
Facility: CLINIC | Age: 48
Discharge: HOME | End: 2023-07-11
Attending: EMERGENCY MEDICINE | Admitting: EMERGENCY MEDICINE
Payer: COMMERCIAL

## 2023-07-10 ENCOUNTER — APPOINTMENT (OUTPATIENT)
Dept: CT IMAGING | Facility: CLINIC | Age: 48
End: 2023-07-10
Attending: EMERGENCY MEDICINE
Payer: COMMERCIAL

## 2023-07-10 DIAGNOSIS — R10.9 LEFT FLANK PAIN: ICD-10-CM

## 2023-07-10 DIAGNOSIS — K42.9 UMBILICAL HERNIA WITHOUT OBSTRUCTION AND WITHOUT GANGRENE: ICD-10-CM

## 2023-07-10 LAB
ALBUMIN UR-MCNC: 10 MG/DL
ANION GAP SERPL CALCULATED.3IONS-SCNC: 12 MMOL/L (ref 5–18)
APPEARANCE UR: CLEAR
BASOPHILS # BLD AUTO: 0 10E3/UL (ref 0–0.2)
BASOPHILS NFR BLD AUTO: 1 %
BILIRUB UR QL STRIP: NEGATIVE
BUN SERPL-MCNC: 11 MG/DL (ref 8–22)
CALCIUM SERPL-MCNC: 9.8 MG/DL (ref 8.5–10.5)
CHLORIDE BLD-SCNC: 100 MMOL/L (ref 98–107)
CO2 SERPL-SCNC: 29 MMOL/L (ref 22–31)
COLOR UR AUTO: YELLOW
CREAT SERPL-MCNC: 1.07 MG/DL (ref 0.7–1.3)
EOSINOPHIL # BLD AUTO: 0 10E3/UL (ref 0–0.7)
EOSINOPHIL NFR BLD AUTO: 0 %
ERYTHROCYTE [DISTWIDTH] IN BLOOD BY AUTOMATED COUNT: 13.3 % (ref 10–15)
GFR SERPL CREATININE-BSD FRML MDRD: 86 ML/MIN/1.73M2
GLUCOSE BLD-MCNC: 112 MG/DL (ref 70–125)
GLUCOSE UR STRIP-MCNC: NEGATIVE MG/DL
HCT VFR BLD AUTO: 49.6 % (ref 40–53)
HGB BLD-MCNC: 16.9 G/DL (ref 13.3–17.7)
HGB UR QL STRIP: NEGATIVE
HOLD SPECIMEN: NORMAL
HYALINE CASTS: 1 /LPF
IMM GRANULOCYTES # BLD: 0.1 10E3/UL
IMM GRANULOCYTES NFR BLD: 1 %
KETONES UR STRIP-MCNC: NEGATIVE MG/DL
LEUKOCYTE ESTERASE UR QL STRIP: NEGATIVE
LYMPHOCYTES # BLD AUTO: 1.8 10E3/UL (ref 0.8–5.3)
LYMPHOCYTES NFR BLD AUTO: 27 %
MCH RBC QN AUTO: 31.5 PG (ref 26.5–33)
MCHC RBC AUTO-ENTMCNC: 34.1 G/DL (ref 31.5–36.5)
MCV RBC AUTO: 93 FL (ref 78–100)
MONOCYTES # BLD AUTO: 0.5 10E3/UL (ref 0–1.3)
MONOCYTES NFR BLD AUTO: 7 %
MUCOUS THREADS #/AREA URNS LPF: PRESENT /LPF
NEUTROPHILS # BLD AUTO: 4.3 10E3/UL (ref 1.6–8.3)
NEUTROPHILS NFR BLD AUTO: 64 %
NITRATE UR QL: NEGATIVE
NRBC # BLD AUTO: 0 10E3/UL
NRBC BLD AUTO-RTO: 0 /100
PH UR STRIP: 5.5 [PH] (ref 5–7)
PLATELET # BLD AUTO: 263 10E3/UL (ref 150–450)
POTASSIUM BLD-SCNC: 3.8 MMOL/L (ref 3.5–5)
RBC # BLD AUTO: 5.36 10E6/UL (ref 4.4–5.9)
RBC URINE: <1 /HPF
SODIUM SERPL-SCNC: 141 MMOL/L (ref 136–145)
SP GR UR STRIP: 1.02 (ref 1–1.03)
SQUAMOUS EPITHELIAL: <1 /HPF
UROBILINOGEN UR STRIP-MCNC: <2 MG/DL
WBC # BLD AUTO: 6.7 10E3/UL (ref 4–11)
WBC URINE: 1 /HPF

## 2023-07-10 PROCEDURE — 96376 TX/PRO/DX INJ SAME DRUG ADON: CPT

## 2023-07-10 PROCEDURE — 74177 CT ABD & PELVIS W/CONTRAST: CPT | Mod: XU

## 2023-07-10 PROCEDURE — 258N000003 HC RX IP 258 OP 636: Performed by: EMERGENCY MEDICINE

## 2023-07-10 PROCEDURE — 85014 HEMATOCRIT: CPT | Performed by: EMERGENCY MEDICINE

## 2023-07-10 PROCEDURE — 96375 TX/PRO/DX INJ NEW DRUG ADDON: CPT | Mod: 59

## 2023-07-10 PROCEDURE — 82310 ASSAY OF CALCIUM: CPT | Performed by: EMERGENCY MEDICINE

## 2023-07-10 PROCEDURE — 250N000013 HC RX MED GY IP 250 OP 250 PS 637: Performed by: EMERGENCY MEDICINE

## 2023-07-10 PROCEDURE — 36415 COLL VENOUS BLD VENIPUNCTURE: CPT | Performed by: EMERGENCY MEDICINE

## 2023-07-10 PROCEDURE — 80048 BASIC METABOLIC PNL TOTAL CA: CPT | Performed by: EMERGENCY MEDICINE

## 2023-07-10 PROCEDURE — 250N000011 HC RX IP 250 OP 636: Performed by: EMERGENCY MEDICINE

## 2023-07-10 PROCEDURE — 96374 THER/PROPH/DIAG INJ IV PUSH: CPT | Mod: 59

## 2023-07-10 PROCEDURE — 96361 HYDRATE IV INFUSION ADD-ON: CPT

## 2023-07-10 PROCEDURE — 81001 URINALYSIS AUTO W/SCOPE: CPT | Performed by: EMERGENCY MEDICINE

## 2023-07-10 PROCEDURE — 74174 CTA ABD&PLVS W/CONTRAST: CPT

## 2023-07-10 PROCEDURE — 99285 EMERGENCY DEPT VISIT HI MDM: CPT | Mod: 25

## 2023-07-10 RX ORDER — HYDROMORPHONE HYDROCHLORIDE 1 MG/ML
0.5 INJECTION, SOLUTION INTRAMUSCULAR; INTRAVENOUS; SUBCUTANEOUS ONCE
Status: COMPLETED | OUTPATIENT
Start: 2023-07-11 | End: 2023-07-10

## 2023-07-10 RX ORDER — MORPHINE SULFATE 4 MG/ML
4 INJECTION, SOLUTION INTRAMUSCULAR; INTRAVENOUS
Status: COMPLETED | OUTPATIENT
Start: 2023-07-10 | End: 2023-07-10

## 2023-07-10 RX ORDER — IOPAMIDOL 755 MG/ML
100 INJECTION, SOLUTION INTRAVASCULAR ONCE
Status: COMPLETED | OUTPATIENT
Start: 2023-07-11 | End: 2023-07-11

## 2023-07-10 RX ORDER — OXYCODONE HYDROCHLORIDE 5 MG/1
5 TABLET ORAL ONCE
Status: COMPLETED | OUTPATIENT
Start: 2023-07-10 | End: 2023-07-10

## 2023-07-10 RX ORDER — ONDANSETRON 4 MG/1
4 TABLET, ORALLY DISINTEGRATING ORAL ONCE
Status: COMPLETED | OUTPATIENT
Start: 2023-07-10 | End: 2023-07-10

## 2023-07-10 RX ORDER — SODIUM CHLORIDE 9 MG/ML
INJECTION, SOLUTION INTRAVENOUS ONCE
Status: COMPLETED | OUTPATIENT
Start: 2023-07-11 | End: 2023-07-11

## 2023-07-10 RX ORDER — IOPAMIDOL 755 MG/ML
100 INJECTION, SOLUTION INTRAVASCULAR ONCE
Status: COMPLETED | OUTPATIENT
Start: 2023-07-10 | End: 2023-07-10

## 2023-07-10 RX ORDER — ONDANSETRON 2 MG/ML
4 INJECTION INTRAMUSCULAR; INTRAVENOUS ONCE
Status: COMPLETED | OUTPATIENT
Start: 2023-07-10 | End: 2023-07-10

## 2023-07-10 RX ORDER — ACETAMINOPHEN 325 MG/1
975 TABLET ORAL ONCE
Status: COMPLETED | OUTPATIENT
Start: 2023-07-10 | End: 2023-07-10

## 2023-07-10 RX ADMIN — ONDANSETRON 4 MG: 2 INJECTION INTRAMUSCULAR; INTRAVENOUS at 21:26

## 2023-07-10 RX ADMIN — SODIUM CHLORIDE 1000 ML: 9 INJECTION, SOLUTION INTRAVENOUS at 21:27

## 2023-07-10 RX ADMIN — IOPAMIDOL 100 ML: 755 INJECTION, SOLUTION INTRAVENOUS at 21:03

## 2023-07-10 RX ADMIN — MORPHINE SULFATE 4 MG: 4 INJECTION, SOLUTION INTRAMUSCULAR; INTRAVENOUS at 21:25

## 2023-07-10 RX ADMIN — OXYCODONE HYDROCHLORIDE 5 MG: 5 TABLET ORAL at 19:37

## 2023-07-10 RX ADMIN — SODIUM CHLORIDE: 9 INJECTION, SOLUTION INTRAVENOUS at 23:46

## 2023-07-10 RX ADMIN — MORPHINE SULFATE 4 MG: 4 INJECTION, SOLUTION INTRAMUSCULAR; INTRAVENOUS at 20:22

## 2023-07-10 RX ADMIN — HYDROMORPHONE HYDROCHLORIDE 0.5 MG: 1 INJECTION, SOLUTION INTRAMUSCULAR; INTRAVENOUS; SUBCUTANEOUS at 23:45

## 2023-07-10 RX ADMIN — ACETAMINOPHEN 975 MG: 325 TABLET ORAL at 15:24

## 2023-07-10 RX ADMIN — ONDANSETRON 4 MG: 4 TABLET, ORALLY DISINTEGRATING ORAL at 15:24

## 2023-07-10 ASSESSMENT — ACTIVITIES OF DAILY LIVING (ADL)
ADLS_ACUITY_SCORE: 35
ADLS_ACUITY_SCORE: 35

## 2023-07-10 ASSESSMENT — ENCOUNTER SYMPTOMS
DIARRHEA: 0
BACK PAIN: 0
ABDOMINAL PAIN: 1
DYSURIA: 0
SHORTNESS OF BREATH: 0
VOMITING: 0
FLANK PAIN: 1
FEVER: 0
NAUSEA: 0
COUGH: 0

## 2023-07-10 NOTE — ED PROVIDER NOTES
EMERGENCY DEPARTMENT ENCOUNTER      NAME: Rowdy Tan  AGE: 47 year old male  YOB: 1975  MRN: [id number]  EVALUATION DATE & TIME: No admission date for patient encounter.    PCP: Natalia Williamson    ED PROVIDER: Natalia Bates M.D.        Chief Complaint   Patient presents with     Flank Pain         FINAL IMPRESSION:    1. Left flank pain    2. Umbilical hernia without obstruction and without gangrene            MEDICAL DECISION MAKIN year old male who is overall healthy and presents emergency department with left leg pain that began while at work this afternoon.  Clinically sounded like a kidney stone, but CT scan negative for kidney stone.  Incidental small umbilical hernia with a small amount of fat was found.  Initially pain was very lateral to the flank area and that would be quite atypical for an umbilical hernia to cause that pain.  Discussed with patient and the plan was to proceed with CTA to rule out things like air dissection though I do think this is also less likely.  If no other findings found it is certainly possible now that his pain has moved more into the umbilical region that all of this is really just due to this umbilical hernia, but I do feel we need to rule out other concerning findings first and CTA currently pending.  If CTA of the chest abdomen pelvis does not show any other acute findings then it would be reasonable to consider discharge home with patient to follow-up with surgery as an outpatient.    No indication for emergent surgical intervention with regards to this hernia as it is only containing fat.  Patient denies any testicular or penile symptoms.  No concerns for things like torsion at this time.        ED COURSE:  4:35 PM  I met with the patient to gather history and perform my exam. ED course and treatment discussed.    11:30 PM  Updated patient on current results.  He is not feeling any better and feels that the pain is actually  "worsening.  Now it is migrated more to the middle of the abdomen.  He states he feels like there is a \"straw \"going down the middle of me that is painful.  While I think aortic dissection is less likely in this patient, it is certainly still a possibility.  Not angiogram CT scan did not show any obvious pathology other than a small umbilical hernia containing fat.  That would not explain the pain that he was having so laterally.  It certainly could explain some pain that he has had examination at this time near the umbilicus.  I spoke with patient about all of this.  Ultimately we decided together to do a CTA of the chest abdomen pelvis.  I will change his pain medication, and disposition dependent upon these results.  Patient agrees with that plan.      12:40 PM  Patient signed out at change of shift waiting CTA results.  Overall anticipate discharge home if this is unremarkable.    I do not think that this represents ACS, PE, ruptured AAA, aortic dissection, bowel obstruction, bowel ischemia, cholecystitis, pancreatitis, appendicitis, diverticulitis, kidney stone, pyelonephritis, incarcerated or strangulated hernia, testicular torsion, viscus perforation, perforated GI ulcer, or other such etiologies at this time.    At the conclusion of the encounter I discussed the results of all of the tests and the disposition. Their questions were answered. The patient (and any family present) acknowledged understanding and were agreeable with the care plan.      CONSULTANTS:  none        MEDICATIONS GIVEN IN THE EMERGENCY:  Medications   ondansetron (ZOFRAN ODT) ODT tab 4 mg (4 mg Oral $Given 7/10/23 1524)   acetaminophen (TYLENOL) tablet 975 mg (975 mg Oral $Given 7/10/23 1524)   oxyCODONE (ROXICODONE) tablet 5 mg (5 mg Oral $Given 7/10/23 1937)   morphine (PF) injection 4 mg (4 mg Intravenous $Given 7/10/23 2125)   iopamidol (ISOVUE-370) solution 100 mL (100 mLs Intravenous $Given 7/10/23 2103)   ondansetron (ZOFRAN) " injection 4 mg (4 mg Intravenous $Given 7/10/23 2126)   0.9% sodium chloride BOLUS (0 mLs Intravenous Stopped 7/10/23 2311)   HYDROmorphone (PF) (DILAUDID) injection 0.5 mg (0.5 mg Intravenous $Given 7/10/23 2345)   sodium chloride 0.9% infusion ( Intravenous $New Bag 7/10/23 2346)   iopamidol (ISOVUE-370) solution 100 mL (80 mLs Intravenous $Given 7/11/23 0028)           NEW PRESCRIPTIONS STARTED AT TODAY'S ER VISIT     Medication List      There are no discharge medications for this visit.             CONDITION:  stable        DISPOSITION:  pending         =================================================================  =================================================================  TRIAGE ASSESSMENT:  PT presents with left sided abdominal pain and left sided flank pain. Denies history of kidney stones. Worst pain of life per patient. Had abdominal pain earlier in the year but this is diffrent     Triage Assessment     Row Name 07/10/23 4267       Triage Assessment (Adult)    Airway WDL WDL       Respiratory WDL    Respiratory WDL WDL       Skin Circulation/Temperature WDL    Skin Circulation/Temperature WDL WDL       Cardiac WDL    Cardiac WDL WDL       Peripheral/Neurovascular WDL    Peripheral Neurovascular WDL WDL       Cognitive/Neuro/Behavioral WDL    Cognitive/Neuro/Behavioral WDL WDL                   ED Triage Vitals [07/10/23 1516]   Enc Vitals Group      /81      Pulse 69      Resp 18      Temp 98  F (36.7  C)      Temp src Temporal      SpO2 97 %      Weight 95.3 kg (210 lb)      Height 1.829 m (6')          ================================================================  ================================================================    HPI    Patient information was obtained from: patient    Use of Intrepreter: N/A     Rowdy Tan is a 47 year old male with significant past medical history who presents to the ER with complaints of flank pain.  Began while he was at work around 2  "PM.  Located in the left flank and left abdomen.  Pain seems like \"spasm\".  Has never had pain like this before.  Denies any history of kidney stones.    Denies otherwise back pain, fever, cough, chest pain, vomiting, diarrhea, dysuria, hematuria.    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      REVIEW OF SYSTEMS  Review of Systems   Constitutional: Negative for fever.   Respiratory: Negative for cough and shortness of breath.    Cardiovascular: Negative for chest pain.   Gastrointestinal: Positive for abdominal pain. Negative for diarrhea, nausea and vomiting.   Genitourinary: Positive for flank pain. Negative for dysuria.   Musculoskeletal: Negative for back pain.   Allergic/Immunologic: Negative for immunocompromised state.   All other systems reviewed and are negative.          PAST MEDICAL HISTORY:  History reviewed. No pertinent past medical history.      PAST SURGICAL HISTORY:  History reviewed. No pertinent surgical history.      CURRENT MEDICATIONS:    Prior to Admission medications    Not on File         ALLERGIES:  Allergies   Allergen Reactions     Aspirin Anaphylaxis     Aspirin (Tartrazine Only) [Tartrazine] Anaphylaxis         FAMILY HISTORY:  History reviewed. No pertinent family history.      SOCIAL HISTORY:  Social History     Socioeconomic History     Marital status:    Tobacco Use     Smoking status: Former     Types: Cigarettes     Quit date: 1996     Years since quittin.2     Smokeless tobacco: Current     Types: Chew   Substance and Sexual Activity     Alcohol use: Yes     Alcohol/week: 3.0 - 5.0 standard drinks of alcohol     Types: 3 - 5 Standard drinks or equivalent per week     Drug use: Not Currently         VITALS:  Patient Vitals for the past 24 hrs:   BP Temp Temp src Pulse Resp SpO2 Height Weight   07/10/23 1516 133/81 98  F (36.7  C) Temporal 69 18 97 % 1.829 m (6') 95.3 kg (210 lb)       Wt Readings from Last 3 Encounters:   07/10/23 95.3 " kg (210 lb)   04/24/23 95.3 kg (210 lb)   01/24/23 102.1 kg (225 lb)       Estimated Creatinine Clearance: 101.1 mL/min (based on SCr of 1.07 mg/dL).    PHYSICAL EXAM    Constitutional:  Well developed, Well nourished, NAD, GCS 15  HENT:  Normocephalic, Atraumatic, Bilateral external ears normal,  Nose normal. Neck- Supple, No stridor.   Eyes:  PERRL, EOMI, Conjunctiva normal, No discharge.  Respiratory:  Normal breath sounds, No respiratory distress, No wheezing, Speaks full sentences easily. No cough.   Cardiovascular:  Normal heart rate, Regular rhythm, No rubs, No gallops.   GI:  No excessive obesity.  Bowel sounds normal, Soft, +left sided mid abd tenderness, No masses, No flank tenderness. No rebound or guarding.   : deferred  Musculoskeletal: No cyanosis, No clubbing. Good range of motion in all major joints. No major deformities noted.   Integument:  Warm, Dry, No erythema, No rash.  No petechiae.   Neurologic:  Alert & oriented x 3,  No focal deficits noted. Normal gait.   Psychiatric:  Affect normal, Cooperative         LAB:  All pertinent labs reviewed and interpreted.  Recent Results (from the past 24 hour(s))   UA with Microscopic reflex to Culture    Collection Time: 07/10/23  3:28 PM    Specimen: Urine, Clean Catch   Result Value Ref Range    Color Urine Yellow Colorless, Straw, Light Yellow, Yellow    Appearance Urine Clear Clear    Glucose Urine Negative Negative mg/dL    Bilirubin Urine Negative Negative    Ketones Urine Negative Negative mg/dL    Specific Gravity Urine 1.024 1.001 - 1.030    Blood Urine Negative Negative    pH Urine 5.5 5.0 - 7.0    Protein Albumin Urine 10 (A) Negative mg/dL    Urobilinogen Urine <2.0 <2.0 mg/dL    Nitrite Urine Negative Negative    Leukocyte Esterase Urine Negative Negative    Mucus Urine Present (A) None Seen /LPF    RBC Urine <1 <=2 /HPF    WBC Urine 1 <=5 /HPF    Squamous Epithelials Urine <1 <=1 /HPF    Hyaline Casts Urine 1 <=2 /LPF   Basic metabolic  panel    Collection Time: 07/10/23  3:28 PM   Result Value Ref Range    Sodium 141 136 - 145 mmol/L    Potassium 3.8 3.5 - 5.0 mmol/L    Chloride 100 98 - 107 mmol/L    Carbon Dioxide (CO2) 29 22 - 31 mmol/L    Anion Gap 12 5 - 18 mmol/L    Urea Nitrogen 11 8 - 22 mg/dL    Creatinine 1.07 0.70 - 1.30 mg/dL    Calcium 9.8 8.5 - 10.5 mg/dL    Glucose 112 70 - 125 mg/dL    GFR Estimate 86 >60 mL/min/1.73m2   Extra Red Top Tube    Collection Time: 07/10/23  3:28 PM   Result Value Ref Range    Hold Specimen JIC    CBC with platelets and differential    Collection Time: 07/10/23  3:28 PM   Result Value Ref Range    WBC Count 6.7 4.0 - 11.0 10e3/uL    RBC Count 5.36 4.40 - 5.90 10e6/uL    Hemoglobin 16.9 13.3 - 17.7 g/dL    Hematocrit 49.6 40.0 - 53.0 %    MCV 93 78 - 100 fL    MCH 31.5 26.5 - 33.0 pg    MCHC 34.1 31.5 - 36.5 g/dL    RDW 13.3 10.0 - 15.0 %    Platelet Count 263 150 - 450 10e3/uL    % Neutrophils 64 %    % Lymphocytes 27 %    % Monocytes 7 %    % Eosinophils 0 %    % Basophils 1 %    % Immature Granulocytes 1 %    NRBCs per 100 WBC 0 <1 /100    Absolute Neutrophils 4.3 1.6 - 8.3 10e3/uL    Absolute Lymphocytes 1.8 0.8 - 5.3 10e3/uL    Absolute Monocytes 0.5 0.0 - 1.3 10e3/uL    Absolute Eosinophils 0.0 0.0 - 0.7 10e3/uL    Absolute Basophils 0.0 0.0 - 0.2 10e3/uL    Absolute Immature Granulocytes 0.1 <=0.4 10e3/uL    Absolute NRBCs 0.0 10e3/uL       [facility] found [facility]: [facility]        RADIOLOGY:  Reviewed all pertinent imaging. Please see official radiology report.    CT Abdomen Pelvis w Contrast   Final Result   IMPRESSION:    1.  No acute findings in the abdomen and pelvis. No stones are identified.      CTA Chest Abdomen Pelvis w Contrast    (Results Pending)         EKG:    none    PROCEDURES:  none    Medical Decision Making    History:    Supplemental history from: Documented in chart, if applicable    External Record(s) reviewed: Documented in chart, if applicable.    Work Up:    Chart documentation  includes differential considered and any EKGs or imaging independently interpreted by provider, where specified.    In additional to work up documented, I considered the following work up: Documented in chart, if applicable.    External consultation:    Discussion of management with another provider: Documented in chart, if applicable    Complicating factors:    Care impacted by chronic illness: N/A    Care affected by social determinants of health: N/A    Disposition considerations: Patient signed out at change of shift awaiting CT scan results and disposition.      Natalia Bates M.D. [facility]  Emergency Medicine and Medical Toxicology  [facility] EMERGENCY ROOM  [address] [facility] [address]  [phone]  Dept: [phone]           Natalia Bates MD  07/11/23 0051

## 2023-07-10 NOTE — ED TRIAGE NOTES
PT presents with left sided abdominal pain and left sided flank pain. Denies history of kidney stones. Worst pain of life per patient. Had abdominal pain earlier in the year but this is diffrent     Triage Assessment     Row Name 07/10/23 9417       Triage Assessment (Adult)    Airway WDL WDL       Respiratory WDL    Respiratory WDL WDL       Skin Circulation/Temperature WDL    Skin Circulation/Temperature WDL WDL       Cardiac WDL    Cardiac WDL WDL       Peripheral/Neurovascular WDL    Peripheral Neurovascular WDL WDL       Cognitive/Neuro/Behavioral WDL    Cognitive/Neuro/Behavioral WDL WDL

## 2023-07-11 VITALS
HEART RATE: 74 BPM | WEIGHT: 210 LBS | TEMPERATURE: 98 F | HEIGHT: 72 IN | SYSTOLIC BLOOD PRESSURE: 143 MMHG | DIASTOLIC BLOOD PRESSURE: 100 MMHG | RESPIRATION RATE: 22 BRPM | OXYGEN SATURATION: 96 % | BODY MASS INDEX: 28.44 KG/M2

## 2023-07-11 PROCEDURE — 250N000011 HC RX IP 250 OP 636: Mod: JZ | Performed by: EMERGENCY MEDICINE

## 2023-07-11 RX ADMIN — IOPAMIDOL 80 ML: 755 INJECTION, SOLUTION INTRAVENOUS at 00:28

## 2023-07-11 ASSESSMENT — ACTIVITIES OF DAILY LIVING (ADL): ADLS_ACUITY_SCORE: 35

## 2023-07-11 NOTE — DISCHARGE INSTRUCTIONS
Tylenol 650 mg every 4 hours as needed for pain  Return to the emergency department for worsening problems or concerns

## 2023-07-26 NOTE — TELEPHONE ENCOUNTER
Caller: Rowdy Tan    Reason for Reschedule/Cancellation (please be detailed, any staff messages or encounters to note?): SULTAN CONDEOO      Prior to reschedule please review:  Ordering Provider: TEQUILA  Sedation per order: MAC  Does patient have any ASC Exclusions, please identify?: N      Notes on Cancelled Procedure:  Procedure: Upper and Lower Endoscopy [EGD and Colonoscopy]   Date: 08/16/2023  Location: [facility] [place]; [address] [place] [address], [address] [place] [address]  Surgeon:       Rescheduled: Yes  Procedure: Upper and Lower Endoscopy [EGD and Colonoscopy]   Date: 10/17/2023  Location: [facility] [place]; [address] [place] [address], [address] [place] [address]  Surgeon: BRITTNEY  Sedation Level Scheduled  MAC,  Reason for Sedation Level PER ORDER  Prep/Instructions updated and sent: VIA [employer]     Send In - basket message to Panc - Chaz Pool if EUS  procedure is canceled or rescheduled: [ N/A, YES or NO] NA

## 2023-09-05 ASSESSMENT — ENCOUNTER SYMPTOMS
DYSURIA: 0
PALPITATIONS: 0
FREQUENCY: 0
COUGH: 0
NERVOUS/ANXIOUS: 0
DIZZINESS: 0
SORE THROAT: 0
ABDOMINAL PAIN: 1
JOINT SWELLING: 0
MYALGIAS: 0
WEAKNESS: 0
PARESTHESIAS: 0
DIARRHEA: 0
ARTHRALGIAS: 0
HEARTBURN: 0
HEMATOCHEZIA: 0
CONSTIPATION: 0
SHORTNESS OF BREATH: 0
FEVER: 0
CHILLS: 0
EYE PAIN: 0
HEADACHES: 0
NAUSEA: 0
HEMATURIA: 0

## 2023-09-06 ENCOUNTER — OFFICE VISIT (OUTPATIENT)
Dept: FAMILY MEDICINE | Facility: CLINIC | Age: 48
End: 2023-09-06
Payer: COMMERCIAL

## 2023-09-06 VITALS
TEMPERATURE: 98.7 F | BODY MASS INDEX: 28.73 KG/M2 | SYSTOLIC BLOOD PRESSURE: 148 MMHG | HEART RATE: 72 BPM | HEIGHT: 72 IN | DIASTOLIC BLOOD PRESSURE: 76 MMHG | OXYGEN SATURATION: 99 % | WEIGHT: 212.1 LBS | RESPIRATION RATE: 16 BRPM

## 2023-09-06 DIAGNOSIS — K40.90 RIGHT INGUINAL HERNIA: ICD-10-CM

## 2023-09-06 DIAGNOSIS — Z23 ENCOUNTER FOR IMMUNIZATION: ICD-10-CM

## 2023-09-06 DIAGNOSIS — Z78.9 ALCOHOL USE: ICD-10-CM

## 2023-09-06 DIAGNOSIS — R03.0 ELEVATED BLOOD PRESSURE READING WITHOUT DIAGNOSIS OF HYPERTENSION: ICD-10-CM

## 2023-09-06 DIAGNOSIS — Z00.00 ROUTINE GENERAL MEDICAL EXAMINATION AT A HEALTH CARE FACILITY: Primary | ICD-10-CM

## 2023-09-06 DIAGNOSIS — E78.2 MIXED HYPERLIPIDEMIA: ICD-10-CM

## 2023-09-06 DIAGNOSIS — Z13.1 SCREENING FOR DIABETES MELLITUS: ICD-10-CM

## 2023-09-06 DIAGNOSIS — Z72.0 CHEWING TOBACCO USE: ICD-10-CM

## 2023-09-06 DIAGNOSIS — F33.41 RECURRENT MAJOR DEPRESSIVE DISORDER, IN PARTIAL REMISSION (H): ICD-10-CM

## 2023-09-06 LAB
ALBUMIN SERPL BCG-MCNC: 4.7 G/DL (ref 3.5–5.2)
ALP SERPL-CCNC: 89 U/L (ref 40–129)
ALT SERPL W P-5'-P-CCNC: 38 U/L (ref 0–70)
ANION GAP SERPL CALCULATED.3IONS-SCNC: 11 MMOL/L (ref 7–15)
AST SERPL W P-5'-P-CCNC: 30 U/L (ref 0–45)
BILIRUB SERPL-MCNC: 0.5 MG/DL
BUN SERPL-MCNC: 12.2 MG/DL (ref 6–20)
CALCIUM SERPL-MCNC: 9.8 MG/DL (ref 8.6–10)
CHLORIDE SERPL-SCNC: 101 MMOL/L (ref 98–107)
CHOLEST SERPL-MCNC: 230 MG/DL
CREAT SERPL-MCNC: 1.03 MG/DL (ref 0.67–1.17)
DEPRECATED HCO3 PLAS-SCNC: 28 MMOL/L (ref 22–29)
EGFRCR SERPLBLD CKD-EPI 2021: 90 ML/MIN/1.73M2
GLUCOSE SERPL-MCNC: 88 MG/DL (ref 70–99)
HDLC SERPL-MCNC: 42 MG/DL
LDLC SERPL CALC-MCNC: 155 MG/DL
NONHDLC SERPL-MCNC: 188 MG/DL
POTASSIUM SERPL-SCNC: 3.8 MMOL/L (ref 3.4–5.3)
PROT SERPL-MCNC: 7.5 G/DL (ref 6.4–8.3)
SODIUM SERPL-SCNC: 140 MMOL/L (ref 136–145)
TRIGL SERPL-MCNC: 167 MG/DL

## 2023-09-06 PROCEDURE — 90682 RIV4 VACC RECOMBINANT DNA IM: CPT | Performed by: STUDENT IN AN ORGANIZED HEALTH CARE EDUCATION/TRAINING PROGRAM

## 2023-09-06 PROCEDURE — 99214 OFFICE O/P EST MOD 30 MIN: CPT | Mod: 25 | Performed by: STUDENT IN AN ORGANIZED HEALTH CARE EDUCATION/TRAINING PROGRAM

## 2023-09-06 PROCEDURE — 36415 COLL VENOUS BLD VENIPUNCTURE: CPT | Performed by: STUDENT IN AN ORGANIZED HEALTH CARE EDUCATION/TRAINING PROGRAM

## 2023-09-06 PROCEDURE — 80053 COMPREHEN METABOLIC PANEL: CPT | Performed by: STUDENT IN AN ORGANIZED HEALTH CARE EDUCATION/TRAINING PROGRAM

## 2023-09-06 PROCEDURE — 86706 HEP B SURFACE ANTIBODY: CPT | Performed by: STUDENT IN AN ORGANIZED HEALTH CARE EDUCATION/TRAINING PROGRAM

## 2023-09-06 PROCEDURE — 87340 HEPATITIS B SURFACE AG IA: CPT | Performed by: STUDENT IN AN ORGANIZED HEALTH CARE EDUCATION/TRAINING PROGRAM

## 2023-09-06 PROCEDURE — 90471 IMMUNIZATION ADMIN: CPT | Performed by: STUDENT IN AN ORGANIZED HEALTH CARE EDUCATION/TRAINING PROGRAM

## 2023-09-06 PROCEDURE — 80061 LIPID PANEL: CPT | Performed by: STUDENT IN AN ORGANIZED HEALTH CARE EDUCATION/TRAINING PROGRAM

## 2023-09-06 PROCEDURE — 99396 PREV VISIT EST AGE 40-64: CPT | Mod: 25 | Performed by: STUDENT IN AN ORGANIZED HEALTH CARE EDUCATION/TRAINING PROGRAM

## 2023-09-06 RX ORDER — NALTREXONE HYDROCHLORIDE 50 MG/1
50 TABLET, FILM COATED ORAL DAILY
Qty: 90 TABLET | Refills: 3 | Status: SHIPPED | OUTPATIENT
Start: 2023-09-06 | End: 2024-09-23

## 2023-09-06 RX ORDER — NALTREXONE HYDROCHLORIDE 50 MG/1
50 TABLET, FILM COATED ORAL DAILY
COMMUNITY
Start: 2023-08-23 | End: 2023-09-06

## 2023-09-06 ASSESSMENT — ENCOUNTER SYMPTOMS
HEADACHES: 0
PALPITATIONS: 0
DIARRHEA: 0
HEARTBURN: 0
FREQUENCY: 0
DYSURIA: 0
SHORTNESS OF BREATH: 0
ARTHRALGIAS: 0
DIZZINESS: 0
EYE PAIN: 0
WEAKNESS: 0
CHILLS: 0
HEMATURIA: 0
PARESTHESIAS: 0
NERVOUS/ANXIOUS: 0
FEVER: 0
MYALGIAS: 0
JOINT SWELLING: 0
ABDOMINAL PAIN: 1
COUGH: 0
HEMATOCHEZIA: 0
NAUSEA: 0
CONSTIPATION: 0
SORE THROAT: 0

## 2023-09-06 NOTE — PATIENT INSTRUCTIONS
Please go to your local pharmacy to get vaccination for tetanus and COVID 19 (when available).       Preventive Health Recommendations  Male Ages 40 to 49    Yearly exam:             See your health care provider every year in order to  o   Review health changes.   o   Discuss preventive care.    o   Review your medicines if your doctor has prescribed any.  You should be tested each year for STDs (sexually transmitted diseases) if you re at risk.   Have a cholesterol test every 5 years.   Have a colonoscopy (test for colon cancer) if someone in your family has had colon cancer or polyps before age 50.   After age 45, have a diabetes test (fasting glucose). If you are at risk for diabetes, you should have this test every 3 years.    Talk with your health care provider about whether or not a prostate cancer screening test (PSA) is right for you.    Shots: Get a flu shot each year. Get a tetanus shot every 10 years.     Nutrition:  Eat at least 5 servings of fruits and vegetables daily.   Eat whole-grain bread, whole-wheat pasta and brown rice instead of white grains and rice.   Get adequate Calcium and Vitamin D.     Lifestyle  Exercise for at least 150 minutes a week (30 minutes a day, 5 days a week). This will help you control your weight and prevent disease.   Limit alcohol to one drink per day.   No smoking.   Wear sunscreen to prevent skin cancer.   See your dentist every six months for an exam and cleaning.

## 2023-09-06 NOTE — PROGRESS NOTES
SUBJECTIVE:   CC: Jameel is an 47 year old who presents for preventative health visit. Did have a couple of ED visit this summer, where an right sided inguinal and umbilical hernia were found. States he does have intermittent pain with his right inguinal hernia from time to time, it will be months in between symptoms, and is able to improve the pain by lying down and pushing it back in. He does not wish to pursue surgery at this time.     Reports mood is improved since starting medicaiton, feels more even.  Denies having SI.  Stopped drinking. He does notice since August and starting naltrexone, and selective serotonin reuptake inhibitor, the he will have spastic movement of right hand and both legs, but this only occurs when he is yawning and no other time.  This is not bothersome to patient.     He has been going to AA. Went to noon meeting today.         2023     3:44 PM   Additional Questions   Roomed by Pearl BERMUDEZ       Healthy Habits:     Getting at least 3 servings of Calcium per day:  Yes    Bi-annual eye exam:  NO    Dental care twice a year:  NO    Sleep apnea or symptoms of sleep apnea:  None    Diet:  Regular (no restrictions)    Frequency of exercise:  4-5 days/week    Duration of exercise:  45-60 minutes    Taking medications regularly:  No    Barriers to taking medications:  None    Medication side effects:  None    Additional concerns today:  Yes      Today's PHQ-9 Score:       2023     4:59 PM   PHQ-9 SCORE   PHQ-9 Total Score MyChart 2 (Minimal depression)   PHQ-9 Total Score 2       Social History     Tobacco Use    Smoking status: Former     Types: Cigarettes     Quit date: 1996     Years since quittin.4    Smokeless tobacco: Current     Types: Chew   Substance Use Topics    Alcohol use: Yes     Alcohol/week: 3.0 - 5.0 standard drinks of alcohol     Types: 3 - 5 Standard drinks or equivalent per week             2023     5:02 PM   Alcohol Use   Prescreen: >3 drinks/day or >7  "drinks/week? Not Applicable       Last PSA: No results found for: PSA    Reviewed orders with patient. Reviewed health maintenance and updated orders accordingly - Yes      Reviewed and updated as needed this visit by clinical staff   Tobacco  Allergies  Meds              Reviewed and updated as needed this visit by Provider                 Past Medical History:   Diagnosis Date    Alcohol use disorder     Major depression in partial remission (H)     Right inguinal hernia     Fat containing, CT previously done in 2023    Right knee pain       Past Surgical History:   Procedure Laterality Date    right knee arthrosoopy Right     for meniscectomy, removal of plicca       Review of Systems   Constitutional:  Negative for chills and fever.   HENT:  Negative for congestion, ear pain, hearing loss and sore throat.    Eyes:  Negative for pain and visual disturbance.   Respiratory:  Negative for cough and shortness of breath.    Cardiovascular:  Negative for chest pain, palpitations and peripheral edema.   Gastrointestinal:  Positive for abdominal pain. Negative for constipation, diarrhea, heartburn, hematochezia and nausea.   Genitourinary:  Negative for dysuria, frequency, genital sores, hematuria, impotence, penile discharge and urgency.   Musculoskeletal:  Negative for arthralgias, joint swelling and myalgias.   Skin:  Negative for rash.   Neurological:  Negative for dizziness, weakness, headaches and paresthesias.   Psychiatric/Behavioral:  Negative for mood changes. The patient is not nervous/anxious.        OBJECTIVE:   BP (!) 148/76 (BP Location: Right arm, Patient Position: Sitting, Cuff Size: Adult Regular)   Pulse 72   Temp 98.7  F (37.1  C) (Oral)   Resp 16   Ht 1.816 m (5' 11.5\")   Wt 96.2 kg (212 lb 1.6 oz)   SpO2 99%   BMI 29.17 kg/m      Physical Exam  GENERAL: healthy, alert and no distress  EYES: Eyes grossly normal to inspection, and conjunctivae and sclerae normal  RESP: lungs clear to " auscultation - no rales, rhonchi or wheezes  CV: regular rate and rhythm, normal S1 S2, no S3 or S4, no murmur, click or rub, no peripheral edema and peripheral pulses strong  ABDOMEN: soft, nontender, no hepatosplenomegaly, no masses and bowel sounds normal  MS: no gross musculoskeletal defects noted, no edema  SKIN: no suspicious lesions or rashes  NEURO: Normal strength and tone, mentation intact and speech normal  PSYCH: mentation appears normal, affect normal/bright    Diagnostic Test Results:  Labs reviewed in Epic    ASSESSMENT/PLAN:   (Z00.00) Routine general medical examination at a health care facility  (primary encounter diagnosis)   Patient presented for preventative care.  Already has colonoscopy planned for October, encourage patient to keep this appointment.  Will assess for need for hepatitis B vaccination, as patient cannot remember whether he had this given or not, with lab work.  Patient does not wish to have immunization for influenza today this was given.  Encouraged patient to obtain a COVID vaccination when new vaccination is available in the pharmacy.  Encourage patient to get tetanus vaccination at pharmacy.  Patient agrees with plan.  Plan: PRIMARY CARE FOLLOW-UP SCHEDULING, Lipid         Profile (Chol, Trig, HDL, LDL calc),         Comprehensive metabolic panel (BMP + Alb, Alk         Phos, ALT, AST, Total. Bili, TP), Hepatitis B         Surface Antibody, Hepatitis B surface antigen,         INFLUENZA VACCINE 18-64Y (FLUBLOK)            (Z78.9) Alcohol use   Patient reports that he was using alcohol until August 2023.  Prior to that for 3 to 4 months he had been drinking quite heavily a 12 pack a night, and every other night 2-3 mixed drinks.  This may have been the cause of or caused by depression which is discussed more below.  Patient is also using counseling through AA, and was there today.  Reports that he is motivated not to drink, and that the naltrexone is helpful.  Does have 1  small side effect of shaking of hands and legs when he yawns only, but no other side effects.  He is willing to continue use of medication.  Placed new prescription for patient which she will continue to take daily.  We will check liver panel.  We will follow-up in 1 to 3 months.    Plan: naltrexone (DEPADE/REVIA) 50 MG tablet,         Comprehensive metabolic panel (BMP + Alb, Alk         Phos, ALT, AST, Total. Bili, TP)            (F33.41) Recurrent major depressive disorder, in partial remission (H)  Comment: Patient was hospitalized for suicidal ideation and severe depression in August, reports that he is feeling much better since his admission.  He was started on Zoloft 50 mg at that time and reports he is tolerating it well.  Does have that side effect as mentioned above, but this is nonbothersome to patient and he wishes to continue taking the medication.  Ordered medication for patient, he will follow-up in 1 to 3 months.  Plan: sertraline (ZOLOFT) 50 MG tablet            (E78.2) Mixed hyperlipidemia  Comment: Patient with history of hyperlipidemia seen on lab work in 2021.  We will repeat lipid profile at this time to assess for ASCVD risk.  Plan: Lipid Profile (Chol, Trig, HDL, LDL calc),         Comprehensive metabolic panel (BMP + Alb, Alk         Phos, ALT, AST, Total. Bili, TP)            (Z13.1) Screening for diabetes mellitus  Comment: Screening lab placed for diabetes.      (Z23) Encounter for immunization  Comment: As above patient was given influenza vaccination today.  Other recommendations for vaccinations as discussed above.      (K40.90) Right inguinal hernia  Comment: Patient has right inguinal hernia seen on CT scan in 2023 which is fat-containing, it which causes him pain every couple months, for short period of time.  He is able to manage the pain, self reducing the hernia.  After discussion with patient he does not wish to pursue surgery at this time.  Given that the hernia is  "fat-containing, should not be at risk for incarcerated hernia.  Patient may notify me if he wishes to pursue surgery.      (Z72.0) Chewing tobacco use  Comment: Patient reported chewing tobacco use, given however that he is currently quitting alcohol use we will not address this today.  However in 3 months if still doing well off of alcohol, would recommend for patient to also stop chewing tobacco.  We will follow-up at next visit      (R03.0) Elevated blood pressure reading without diagnosis of hypertension  Comment: Patient with elevated blood pressure today, did not address at visit.  Will address at next visit if elevated blood pressure is again present.            COUNSELING:   Reviewed preventive health counseling, as reflected in patient instructions       Alcohol Use       BMI:   Estimated body mass index is 29.17 kg/m  as calculated from the following:    Height as of this encounter: 1.816 m (5' 11.5\").    Weight as of this encounter: 96.2 kg (212 lb 1.6 oz).         He reports that he quit smoking about 27 years ago. His smoking use included cigarettes. His smokeless tobacco use includes chew.            Regulo Gamboa MD  [facility]  "

## 2023-09-07 DIAGNOSIS — Z28.39 UNDERIMMUNIZATION STATUS: Primary | ICD-10-CM

## 2023-09-07 LAB
HBV SURFACE AB SERPL IA-ACNC: 0.35 M[IU]/ML
HBV SURFACE AB SERPL IA-ACNC: NONREACTIVE M[IU]/ML
HBV SURFACE AG SERPL QL IA: NONREACTIVE

## 2023-09-12 ENCOUNTER — ALLIED HEALTH/NURSE VISIT (OUTPATIENT)
Dept: FAMILY MEDICINE | Facility: CLINIC | Age: 48
End: 2023-09-12
Payer: COMMERCIAL

## 2023-09-12 DIAGNOSIS — Z28.39 UNDERIMMUNIZATION STATUS: ICD-10-CM

## 2023-09-12 DIAGNOSIS — Z23 ENCOUNTER FOR IMMUNIZATION: Primary | ICD-10-CM

## 2023-09-12 PROCEDURE — 99207 PR NO CHARGE NURSE ONLY: CPT

## 2023-09-12 PROCEDURE — 90746 HEPB VACCINE 3 DOSE ADULT IM: CPT

## 2023-09-12 PROCEDURE — 90471 IMMUNIZATION ADMIN: CPT

## 2023-10-03 ENCOUNTER — TELEPHONE (OUTPATIENT)
Dept: GASTROENTEROLOGY | Facility: CLINIC | Age: 48
End: 2023-10-03
Payer: COMMERCIAL

## 2023-10-03 NOTE — TELEPHONE ENCOUNTER
Pre visit planning completed.      Procedure details:    Patient scheduled for Colonoscopy/Upper endoscopy (EGD) on 10/17/23.     Arrival time: 1245. Procedure time 1345    Pre op exam needed? N/A    Facility location: [facility]; [address] [place] [address], [address], [place] [address]    Sedation type: MAC    Indication for procedure: melena, dark ching stools, LLQ pain, due for screening colonscopy       Chart review:     Electronic implanted devices? No    Diabetic? No    Diabetic medication HOLDING recommendations: (if applicable)  Oral diabetic medications: No  Diabetic injectables: No  Insulin: No      Medication review:    Anticoagulants? No    NSAIDS? No    Other medication HOLDING recommendations:  Naltrexone: HOLD 3 days before procedure.       Prep for procedure:     Bowel prep recommendation: Miralax prep without magnesium citrate   Due to:  standard prep due to recall.    Prep instructions sent via [employer]       Natalia Torres RN  Endoscopy Procedure Pre Assessment RN  [phone] option 4

## 2023-10-04 NOTE — TELEPHONE ENCOUNTER
Attempted to contact patient in order to complete pre assessment questions.     Patient scheduled for Colonoscopy/Upper endoscopy (EGD) on 10/17/23.     No answer. Left message to return call to [phone] option 4    [employer]ila[employer] messages sent    Araceli Oglesby RN  Endoscopy Procedure Pre Assessment RN

## 2023-10-04 NOTE — TELEPHONE ENCOUNTER
Pre assessment completed for upcoming procedure.   (Please see previous telephone encounter notes for complete details)    Patient  returned call.       Procedure details:    Arrival time and facility location reviewed.    Pre op exam needed? N/A    Designated  policy reviewed. Instructed to have someone stay 24 hours post procedure.     COVID policy reviewed.      Medication review:    Medications reviewed. Please see supporting documentation below. Holding recommendations discussed (if applicable).       Prep for procedure:     Procedure prep instructions reviewed.        Additional information needed?  N/A      Patient  verbalized understanding and had no questions or concerns at this time.      Olivia Cesar RN  Endoscopy Procedure Pre Assessment RN  [phone] option 4

## 2023-10-10 ENCOUNTER — ALLIED HEALTH/NURSE VISIT (OUTPATIENT)
Dept: FAMILY MEDICINE | Facility: CLINIC | Age: 48
End: 2023-10-10
Payer: COMMERCIAL

## 2023-10-10 DIAGNOSIS — Z23 ENCOUNTER FOR IMMUNIZATION: Primary | ICD-10-CM

## 2023-10-10 PROCEDURE — 90746 HEPB VACCINE 3 DOSE ADULT IM: CPT

## 2023-10-10 PROCEDURE — 90471 IMMUNIZATION ADMIN: CPT

## 2023-10-10 NOTE — PROGRESS NOTES
Prior to immunization administration, verified patients identity using patient s name and date of birth. Please see Immunization Activity for additional information.     Screening Questionnaire for Adult Immunization    Are you sick today?   No   Do you have allergies to medications, food, a vaccine component or latex?   No   Have you ever had a serious reaction after receiving a vaccination?   No   Do you have a long-term health problem with heart, lung, kidney, or metabolic disease (e.g., diabetes), asthma, a blood disorder, no spleen, complement component deficiency, a cochlear implant, or a spinal fluid leak?  Are you on long-term aspirin therapy?   No   Do you have cancer, leukemia, HIV/AIDS, or any other immune system problem?   No   Do you have a parent, brother, or sister with an immune system problem?   No   In the past 3 months, have you taken medications that affect  your immune system, such as prednisone, other steroids, or anticancer drugs; drugs for the treatment of rheumatoid arthritis, Crohn s disease, or psoriasis; or have you had radiation treatments?   No   Have you had a seizure, or a brain or other nervous system problem?   No   During the past year, have you received a transfusion of blood or blood    products, or been given immune (gamma) globulin or antiviral drug?   No   For women: Are you pregnant or is there a chance you could become       pregnant during the next month?   No   Have you received any vaccinations in the past 4 weeks?   No     Immunization questionnaire answers were all negative.    I have reviewed the following standing orders:   This patient is due and qualifies for the Hepatitis B vaccine.    Click here for Hepatitis B Standing Order    I have reviewed the vaccines inclusion and exclusion criteria; No concerns regarding eligibility.     Patient instructed to remain in clinic for 15 minutes afterwards, and to report any adverse reactions.     Screening performed by  Rosanna Cintron RN on 10/10/2023 at 8:41 AM.

## 2023-10-17 ENCOUNTER — ANESTHESIA (OUTPATIENT)
Dept: SURGERY | Facility: AMBULATORY SURGERY CENTER | Age: 48
End: 2023-10-17
Payer: COMMERCIAL

## 2023-10-17 ENCOUNTER — HOSPITAL ENCOUNTER (OUTPATIENT)
Facility: AMBULATORY SURGERY CENTER | Age: 48
Discharge: HOME | End: 2023-10-17
Attending: INTERNAL MEDICINE
Payer: COMMERCIAL

## 2023-10-17 ENCOUNTER — ANESTHESIA EVENT (OUTPATIENT)
Dept: SURGERY | Facility: AMBULATORY SURGERY CENTER | Age: 48
End: 2023-10-17
Payer: COMMERCIAL

## 2023-10-17 VITALS
BODY MASS INDEX: 26.68 KG/M2 | HEART RATE: 71 BPM | TEMPERATURE: 98 F | OXYGEN SATURATION: 97 % | DIASTOLIC BLOOD PRESSURE: 75 MMHG | WEIGHT: 197 LBS | RESPIRATION RATE: 16 BRPM | SYSTOLIC BLOOD PRESSURE: 116 MMHG | HEIGHT: 72 IN

## 2023-10-17 VITALS — HEART RATE: 97 BPM

## 2023-10-17 LAB
COLONOSCOPY: NORMAL
UPPER GI ENDOSCOPY: NORMAL

## 2023-10-17 PROCEDURE — 43239 EGD BIOPSY SINGLE/MULTIPLE: CPT | Performed by: INTERNAL MEDICINE

## 2023-10-17 PROCEDURE — 45385 COLONOSCOPY W/LESION REMOVAL: CPT | Mod: 33 | Performed by: INTERNAL MEDICINE

## 2023-10-17 PROCEDURE — 88305 TISSUE EXAM BY PATHOLOGIST: CPT | Mod: TC | Performed by: INTERNAL MEDICINE

## 2023-10-17 PROCEDURE — 88305 TISSUE EXAM BY PATHOLOGIST: CPT | Mod: 26 | Performed by: PATHOLOGY

## 2023-10-17 PROCEDURE — 43235 EGD DIAGNOSTIC BRUSH WASH: CPT | Performed by: INTERNAL MEDICINE

## 2023-10-17 RX ORDER — NALOXONE HYDROCHLORIDE 0.4 MG/ML
0.2 INJECTION, SOLUTION INTRAMUSCULAR; INTRAVENOUS; SUBCUTANEOUS
Status: CANCELLED | OUTPATIENT
Start: 2023-10-17

## 2023-10-17 RX ORDER — NALOXONE HYDROCHLORIDE 0.4 MG/ML
0.4 INJECTION, SOLUTION INTRAMUSCULAR; INTRAVENOUS; SUBCUTANEOUS
Status: CANCELLED | OUTPATIENT
Start: 2023-10-17

## 2023-10-17 RX ORDER — FLUMAZENIL 0.1 MG/ML
0.2 INJECTION, SOLUTION INTRAVENOUS
Status: CANCELLED | OUTPATIENT
Start: 2023-10-17 | End: 2023-10-17

## 2023-10-17 RX ORDER — PROPOFOL 10 MG/ML
INJECTION, EMULSION INTRAVENOUS PRN
Status: DISCONTINUED | OUTPATIENT
Start: 2023-10-17 | End: 2023-10-17

## 2023-10-17 RX ORDER — PROPOFOL 10 MG/ML
INJECTION, EMULSION INTRAVENOUS CONTINUOUS PRN
Status: DISCONTINUED | OUTPATIENT
Start: 2023-10-17 | End: 2023-10-17

## 2023-10-17 RX ORDER — LIDOCAINE 40 MG/G
CREAM TOPICAL
Status: DISCONTINUED | OUTPATIENT
Start: 2023-10-17 | End: 2023-10-18 | Stop reason: HOSPADM

## 2023-10-17 RX ORDER — ONDANSETRON 4 MG/1
4 TABLET, ORALLY DISINTEGRATING ORAL EVERY 6 HOURS PRN
Status: CANCELLED | OUTPATIENT
Start: 2023-10-17

## 2023-10-17 RX ORDER — GLYCOPYRROLATE 0.2 MG/ML
INJECTION, SOLUTION INTRAMUSCULAR; INTRAVENOUS PRN
Status: DISCONTINUED | OUTPATIENT
Start: 2023-10-17 | End: 2023-10-17

## 2023-10-17 RX ORDER — SODIUM CHLORIDE, SODIUM LACTATE, POTASSIUM CHLORIDE, CALCIUM CHLORIDE 600; 310; 30; 20 MG/100ML; MG/100ML; MG/100ML; MG/100ML
INJECTION, SOLUTION INTRAVENOUS CONTINUOUS
Status: DISCONTINUED | OUTPATIENT
Start: 2023-10-17 | End: 2023-10-18 | Stop reason: HOSPADM

## 2023-10-17 RX ORDER — ONDANSETRON 2 MG/ML
4 INJECTION INTRAMUSCULAR; INTRAVENOUS
Status: DISCONTINUED | OUTPATIENT
Start: 2023-10-17 | End: 2023-10-18 | Stop reason: HOSPADM

## 2023-10-17 RX ORDER — ONDANSETRON 2 MG/ML
4 INJECTION INTRAMUSCULAR; INTRAVENOUS EVERY 6 HOURS PRN
Status: CANCELLED | OUTPATIENT
Start: 2023-10-17

## 2023-10-17 RX ADMIN — SODIUM CHLORIDE, SODIUM LACTATE, POTASSIUM CHLORIDE, CALCIUM CHLORIDE: 600; 310; 30; 20 INJECTION, SOLUTION INTRAVENOUS at 13:43

## 2023-10-17 RX ADMIN — SODIUM CHLORIDE, SODIUM LACTATE, POTASSIUM CHLORIDE, CALCIUM CHLORIDE: 600; 310; 30; 20 INJECTION, SOLUTION INTRAVENOUS at 14:05

## 2023-10-17 RX ADMIN — PROPOFOL 100 MCG/KG/MIN: 10 INJECTION, EMULSION INTRAVENOUS at 15:00

## 2023-10-17 RX ADMIN — PROPOFOL 90 MG: 10 INJECTION, EMULSION INTRAVENOUS at 14:21

## 2023-10-17 RX ADMIN — GLYCOPYRROLATE 0.2 MG: 0.2 INJECTION, SOLUTION INTRAMUSCULAR; INTRAVENOUS at 14:21

## 2023-10-17 RX ADMIN — PROPOFOL 200 MCG/KG/MIN: 10 INJECTION, EMULSION INTRAVENOUS at 14:21

## 2023-10-17 ASSESSMENT — LIFESTYLE VARIABLES: TOBACCO_USE: 1

## 2023-10-17 NOTE — ANESTHESIA POSTPROCEDURE EVALUATION
Patient: Rowdy Tan    Procedure: Procedure(s):  Colonoscopy WITH POLYPECTOMY USING HOT SNARE  Esophagoscopy, gastroscopy, duodenoscopy (EGD), combined       Anesthesia Type:  MAC    Note:  Disposition: Outpatient   Postop Pain Control: Uneventful            Sign Out: Well controlled pain   PONV: No   Neuro/Psych: Uneventful            Sign Out: Acceptable/Baseline neuro status   Airway/Respiratory: Uneventful            Sign Out: Acceptable/Baseline resp. status   CV/Hemodynamics: Uneventful            Sign Out: Acceptable CV status; No obvious hypovolemia; No obvious fluid overload   Other NRE: NONE   DID A NON-ROUTINE EVENT OCCUR? No           Last vitals:  Vitals Value Taken Time   /75 10/17/23 1530   Temp 36.7  C (98  F) 10/17/23 1530   Pulse 71 10/17/23 1530   Resp 16 10/17/23 1530   SpO2 97 % 10/17/23 1530       Electronically Signed By: Robert Mckinney MD  October 17, 2023  4:04 PM

## 2023-10-17 NOTE — ANESTHESIA CARE TRANSFER NOTE
Patient: Rowdy Tan    Procedure: Procedure(s):  Colonoscopy WITH POLYPECTOMY USING HOT SNARE  Esophagoscopy, gastroscopy, duodenoscopy (EGD), combined       Diagnosis: LLQ abdominal pain [R10.32]  Diagnosis Additional Information: No value filed.    Anesthesia Type:   MAC     Note:    Oropharynx: oropharynx clear of all foreign objects and spontaneously breathing  Level of Consciousness: awake  Oxygen Supplementation: room air    Independent Airway: airway patency satisfactory and stable  Dentition: dentition unchanged  Vital Signs Stable: post-procedure vital signs reviewed and stable  Report to RN Given: handoff report given  Patient transferred to: Phase II    Handoff Report: Identifed the Patient, Identified the Reponsible Provider, Reviewed the pertinent medical history, Discussed the surgical course, Reviewed Intra-OP anesthesia mangement and issues during anesthesia, Set expectations for post-procedure period and Allowed opportunity for questions and acknowledgement of understanding      Vitals:  Vitals Value Taken Time   BP     Temp     Pulse     Resp     SpO2         Electronically Signed By: BAY Tan CRNA  October 17, 2023  3:14 PM

## 2023-10-17 NOTE — ANESTHESIA PREPROCEDURE EVALUATION
Anesthesia Pre-Procedure Evaluation    Patient: Rowdy Tan   MRN: [id number] : 1975        Procedure : Procedure(s):  Colonoscopy  Esophagoscopy, gastroscopy, duodenoscopy (EGD), combined          Past Medical History:   Diagnosis Date    Alcohol use disorder     Major depression in partial remission (H)     Right inguinal hernia     Fat containing, CT previously done in     Right knee pain       Past Surgical History:   Procedure Laterality Date    right knee arthrosoopy Right     for meniscectomy, removal of plicca      Allergies   Allergen Reactions    Aspirin Anaphylaxis    Aspirin (Tartrazine Only) [Tartrazine] Anaphylaxis      Social History     Tobacco Use    Smoking status: Former     Types: Cigarettes     Quit date: 1996     Years since quittin.5    Smokeless tobacco: Current     Types: Chew   Substance Use Topics    Alcohol use: Not Currently     Alcohol/week: 3.0 - 5.0 standard drinks of alcohol     Types: 3 - 5 Standard drinks or equivalent per week     Comment: Has not drank since getting admitted in Aug 2023.      Wt Readings from Last 1 Encounters:   23 96.2 kg (212 lb 1.6 oz)        Anesthesia Evaluation   Pt has had prior anesthetic.     No history of anesthetic complications       ROS/MED HX  ENT/Pulmonary:     (+)                tobacco use, Past use,                      Neurologic:  - neg neurologic ROS     Cardiovascular:  - neg cardiovascular ROS     METS/Exercise Tolerance: >4 METS    Hematologic:  - neg hematologic  ROS     Musculoskeletal:       GI/Hepatic:  - neg GI/hepatic ROS     Renal/Genitourinary:  - neg Renal ROS     Endo:  - neg endo ROS     Psychiatric/Substance Use:     (+) psychiatric history depression alcohol abuse      Infectious Disease:  - neg infectious disease ROS     Malignancy:  - neg malignancy ROS     Other:  - neg other ROS          Physical Exam    Airway  airway exam normal      Mallampati: II   TM distance: > 3 FB   Neck ROM: full  "  Mouth opening: > 3 cm    Respiratory Devices and Support         Dental       (+) Completely normal teeth      Cardiovascular   cardiovascular exam normal       Rhythm and rate: regular and normal     Pulmonary   pulmonary exam normal        breath sounds clear to auscultation           OUTSIDE LABS:  CBC:   Lab Results   Component Value Date    WBC 6.7 07/10/2023    WBC 6.7 11/22/2021    HGB 16.9 07/10/2023    HGB 16.4 11/22/2021    HCT 49.6 07/10/2023    HCT 48.5 11/22/2021     07/10/2023     11/22/2021     BMP:   Lab Results   Component Value Date     09/06/2023     07/10/2023    POTASSIUM 3.8 09/06/2023    POTASSIUM 3.8 07/10/2023    CHLORIDE 101 09/06/2023    CHLORIDE 100 07/10/2023    CO2 28 09/06/2023    CO2 29 07/10/2023    BUN 12.2 09/06/2023    BUN 11 07/10/2023    CR 1.03 09/06/2023    CR 1.07 07/10/2023    GLC 88 09/06/2023     07/10/2023     COAGS: No results found for: \"PTT\", \"INR\", \"FIBR\"  POC: No results found for: \"BGM\", \"HCG\", \"HCGS\"  HEPATIC:   Lab Results   Component Value Date    ALBUMIN 4.7 09/06/2023    PROTTOTAL 7.5 09/06/2023    ALT 38 09/06/2023    AST 30 09/06/2023    ALKPHOS 89 09/06/2023    BILITOTAL 0.5 09/06/2023     OTHER:   Lab Results   Component Value Date    A1C 5.1 09/23/2021    SAW 9.8 09/06/2023    MAG 1.9 11/22/2021    AMYLASE 114 05/11/2018       Anesthesia Plan    ASA Status:  2    NPO Status:  NPO Appropriate    Anesthesia Type: MAC.     - Reason for MAC: Deep or markedly invasive procedure (G8)   Induction: Propofol.   Maintenance: N/A.        Consents    Anesthesia Plan(s) and associated risks, benefits, and realistic alternatives discussed. Questions answered and patient/representative(s) expressed understanding.     - Discussed:     - Discussed with:  Patient       Use of blood products discussed: No .     Postoperative Care            Comments:           H&P reviewed: Unable to attach H&P to encounter due to EHR limitations. H&P " Update: appropriate H&P reviewed, patient examined. No interval changes since H&P (within 30 days).         Gerardo Hartman, DO

## 2023-10-17 NOTE — H&P
"Rowdy Tan  [id number]  male  47 year old      Reason for procedure/surgery: \"dark stools\" and colonoscopy for screening    Patient Active Problem List   Diagnosis    Streptococcal Sore Throat    Lump on neck       Past Surgical History:    Past Surgical History:   Procedure Laterality Date    right knee arthrosoopy Right     for meniscectomy, removal of plicca       Past Medical History:   Past Medical History:   Diagnosis Date    Alcohol use disorder     Major depression in partial remission (H24)     Right inguinal hernia     Fat containing, CT previously done in     Right knee pain        Social History:   Social History     Tobacco Use    Smoking status: Former     Types: Cigarettes     Quit date: 1996     Years since quittin.5    Smokeless tobacco: Current     Types: Chew   Substance Use Topics    Alcohol use: Not Currently     Alcohol/week: 3.0 - 5.0 standard drinks of alcohol     Types: 3 - 5 Standard drinks or equivalent per week     Comment: Has not drank since getting admitted in Aug 2023.       Family History: History reviewed. No pertinent family history.    Allergies:   Allergies   Allergen Reactions    Aspirin Anaphylaxis    Aspirin (Tartrazine Only) [Tartrazine] Anaphylaxis       Active Medications:   Current Outpatient Medications   Medication Sig Dispense Refill    naltrexone (DEPADE/REVIA) 50 MG tablet Take 1 tablet (50 mg) by mouth daily 90 tablet 3    sertraline (ZOLOFT) 50 MG tablet Take 1 tablet (50 mg) by mouth daily 90 tablet 3       Systemic Review:   ROS otherwise negative    Physical Examination:   Vital Signs: /72   Pulse 91   Temp 97.8  F (36.6  C) (Temporal)   Resp 16   Ht 1.816 m (5' 11.5\")   Wt 89.4 kg (197 lb)   SpO2 96%   BMI 27.09 kg/m    GENERAL: healthy, alert and no distress  HENT: oropharynx clear and oral mucous membranes moist, Mallampati II  NECK: Normal ROM  RESP: lungs clear to auscultation - no rales, rhonchi or wheezes  CV: regular rate " and rhythm, normal S1 S2,   ABDOMEN: soft, nontender, and bowel sounds normal  MS: no gross musculoskeletal defects noted, no edema      Plan: Appropriate to proceed as scheduled.      Camelia Mcallister MD  10/17/2023    PCP:  Regulo Gamboa

## 2023-10-18 LAB
PATH REPORT.COMMENTS IMP SPEC: NORMAL
PATH REPORT.COMMENTS IMP SPEC: NORMAL
PATH REPORT.FINAL DX SPEC: NORMAL
PATH REPORT.GROSS SPEC: NORMAL
PATH REPORT.MICROSCOPIC SPEC OTHER STN: NORMAL
PATH REPORT.RELEVANT HX SPEC: NORMAL
PHOTO IMAGE: NORMAL

## 2023-11-10 ENCOUNTER — MYC REFILL (OUTPATIENT)
Dept: FAMILY MEDICINE | Facility: CLINIC | Age: 48
End: 2023-11-10
Payer: COMMERCIAL

## 2023-11-10 DIAGNOSIS — F33.41 RECURRENT MAJOR DEPRESSIVE DISORDER, IN PARTIAL REMISSION (H): ICD-10-CM

## 2024-01-17 ENCOUNTER — IMMUNIZATION (OUTPATIENT)
Dept: NURSING | Facility: CLINIC | Age: 49
End: 2024-01-17
Payer: COMMERCIAL

## 2024-01-17 PROCEDURE — 90480 ADMN SARSCOV2 VAC 1/ONLY CMP: CPT

## 2024-01-17 PROCEDURE — 91320 SARSCV2 VAC 30MCG TRS-SUC IM: CPT

## 2024-03-12 ENCOUNTER — ALLIED HEALTH/NURSE VISIT (OUTPATIENT)
Dept: FAMILY MEDICINE | Facility: CLINIC | Age: 49
End: 2024-03-12
Payer: COMMERCIAL

## 2024-03-12 DIAGNOSIS — Z23 ENCOUNTER FOR IMMUNIZATION: Primary | ICD-10-CM

## 2024-03-12 PROCEDURE — 90471 IMMUNIZATION ADMIN: CPT

## 2024-03-12 PROCEDURE — 90746 HEPB VACCINE 3 DOSE ADULT IM: CPT

## 2024-03-12 PROCEDURE — 99207 PR NO CHARGE NURSE ONLY: CPT

## 2024-03-12 NOTE — PROGRESS NOTES
Prior to immunization administration, verified patients identity using patient s name and date of birth. Please see Immunization Activity for additional information.     Screening Questionnaire for Adult Immunization    Are you sick today?   No   Do you have allergies to medications, food, a vaccine component or latex?   No   Have you ever had a serious reaction after receiving a vaccination?   No   Do you have a long-term health problem with heart, lung, kidney, or metabolic disease (e.g., diabetes), asthma, a blood disorder, no spleen, complement component deficiency, a cochlear implant, or a spinal fluid leak?  Are you on long-term aspirin therapy?   No   Do you have cancer, leukemia, HIV/AIDS, or any other immune system problem?   No   Do you have a parent, brother, or sister with an immune system problem?   No   In the past 3 months, have you taken medications that affect  your immune system, such as prednisone, other steroids, or anticancer drugs; drugs for the treatment of rheumatoid arthritis, Crohn s disease, or psoriasis; or have you had radiation treatments?   No   Have you had a seizure, or a brain or other nervous system problem?   No   During the past year, have you received a transfusion of blood or blood    products, or been given immune (gamma) globulin or antiviral drug?   No   For women: Are you pregnant or is there a chance you could become       pregnant during the next month?   No   Have you received any vaccinations in the past 4 weeks?   No     Immunization questionnaire answers were all negative.    I have reviewed the following standing orders:   This patient is due and qualifies for the Hepatitis B vaccine.    Click here for Hepatitis B Standing Order    I have reviewed the vaccines inclusion and exclusion criteria; No concerns regarding eligibility.     Patient instructed to remain in clinic for 15 minutes afterwards, and to report any adverse reactions.     Screening performed by [place]  REED Joaquin on 3/12/2024 at 8:45 AM.

## 2024-05-07 ENCOUNTER — TRANSFERRED RECORDS (OUTPATIENT)
Dept: HEALTH INFORMATION MANAGEMENT | Facility: CLINIC | Age: 49
End: 2024-05-07
Payer: COMMERCIAL

## 2024-06-27 ENCOUNTER — TRANSFERRED RECORDS (OUTPATIENT)
Dept: HEALTH INFORMATION MANAGEMENT | Facility: CLINIC | Age: 49
End: 2024-06-27
Payer: COMMERCIAL

## 2024-07-03 ENCOUNTER — TRANSFERRED RECORDS (OUTPATIENT)
Dept: HEALTH INFORMATION MANAGEMENT | Facility: CLINIC | Age: 49
End: 2024-07-03
Payer: COMMERCIAL

## 2024-07-17 ENCOUNTER — TRANSFERRED RECORDS (OUTPATIENT)
Dept: HEALTH INFORMATION MANAGEMENT | Facility: CLINIC | Age: 49
End: 2024-07-17
Payer: COMMERCIAL

## 2024-08-14 ENCOUNTER — TRANSFERRED RECORDS (OUTPATIENT)
Dept: HEALTH INFORMATION MANAGEMENT | Facility: CLINIC | Age: 49
End: 2024-08-14
Payer: COMMERCIAL

## 2024-09-02 ENCOUNTER — MYC MEDICAL ADVICE (OUTPATIENT)
Dept: FAMILY MEDICINE | Facility: CLINIC | Age: 49
End: 2024-09-02
Payer: COMMERCIAL

## 2024-09-02 DIAGNOSIS — U07.1 INFECTION DUE TO 2019 NOVEL CORONAVIRUS: Primary | ICD-10-CM

## 2024-09-03 NOTE — ADDENDUM NOTE
Addended by: PATTIE HERNANDEZ on: 9/3/2024 11:11 AM     Modules accepted: Orders    
DISPLAY PLAN FREE TEXT

## 2024-09-03 NOTE — TELEPHONE ENCOUNTER
RN COVID TREATMENT VISIT  09/03/24      The patient has been triaged and does not require a higher level of care.    Rowdy Tan  48 year old  Current weight? 200    Has the patient been seen by a primary care provider at an [facility] or [facility] within the past two years? Yes.   Have you been in close proximity to/do you have a known exposure to a person with a confirmed case of influenza? No.     General treatment eligibility:  Date of positive COVID test (PCR or at home)?  8/31/24    Are you or have you been hospitalized for this COVID-19 infection? No.   Have you received monoclonal antibodies or antiviral treatment for COVID-19 since this positive test? No.   Do you have any of the following conditions that place you at risk of being very sick from COVID-19?   - Overweight or Obesity (BMI >85th percentile or BMI 25 or higher)  - Current or former smoker  Yes, patient has at least one high risk condition as noted above.     Current COVID symptoms:   - fever or chills  - cough  - muscle or body aches  - new loss of taste or smell  Yes. Patient has at least one symptom as selected.     How many days since symptoms started? 5 days or less. Established patient, 12 years or older weighing at least 88.2 lbs, who has symptoms that started in the past 5 days, has not been hospitalized nor received treatment already, and is at risk for being very sick from COVID-19.     Treatment eligibility by RN:  Are you currently pregnant or nursing? No  Do you have a clinically significant hypersensitivity to nirmatrelvir or ritonavir, or toxic epidermal necrolysis (TEN) or Broussard-Dominick Syndrome? No  Do you have a history of hepatitis, any hepatic impairment on the Problem List (such as Child-Ramirez Class C, cirrhosis, fatty liver disease, alcoholic liver disease), or was the last liver lab (hepatic panel, ALT, AST, ALK Phos, bilirubin) elevated in the past 6 months? No  Do you have any history  of severe renal impairment (eGFR < 30mL/min)? No    Is patient eligible to continue? Yes, patient meets all eligibility requirements for the RN COVID treatment (as denoted by all no responses above).     Current Outpatient Medications   Medication Sig Dispense Refill    naltrexone (DEPADE/REVIA) 50 MG tablet Take 1 tablet (50 mg) by mouth daily 90 tablet 3    sertraline (ZOLOFT) 50 MG tablet Take 1 tablet (50 mg) by mouth daily 90 tablet 3       Medications from List 1 of the standing order (on medications that exclude the use of Paxlovid) that patient is taking: NONE. Is patient taking Lauro's Wort? No  Is patient taking Pyatt's Wort or any meds from List 1? No.   Medications from List 2 of the standing order (on meds that provider needs to adjust) that patient is taking: NONE. Is patient on any of the meds from List 2? No.   Medications from List 3 of standing order (on meds that a RN needs to adjust) that patient is taking: NONE. Is patient on any meds from List 3? No.     Paxlovid has an approximate 90% reduction in hospitalization. Paxlovid can possibly cause altered sense of taste, diarrhea (loose, watery stools), high blood pressure, muscle aches.     Would patient like a Paxlovid prescription?   Yes.   Lab Results   Component Value Date    GFRESTIMATED 90 09/06/2023       Was last eGFR reduced? No, eGFR 60 or greater/ No Result on record. Patient can receive the normal renal function dose. Paxlovid Rx sent to [facility]    Temporary change to home medications:     All medication adjustments (holds, etc) were discussed with the patient and patient was asked to repeat back (teachback) their med adjustment.  Did patient understand med adjustment? No medication adjustments needed.         Reviewed the following instructions with the patient:    Paxlovid (nimatrelvir and ritonavir)    How it works  Two medicines (nirmatrelvir and ritonavir) are taken together. They stop the virus from growing. Less  amount of virus is easier for your body to fight.    How to take  Medicine comes in a daily container with both medicine tablets. Take by mouth twice daily (once in the morning, once at night) for 5 days.  The number of tablets to take varies by patient.  Don't chew or break capsules. Swallow whole.    When to take  Take as soon as possible after positive COVID-19 test result, and within 5 days of your first symptoms.    Possible side effects  Can cause altered sense of taste, diarrhea (loose, watery stools), high blood pressure, muscle aches.    Patient encouraged to contact insurance to determine if prescription is covered under drug plan.     Christin Chiu RN

## 2024-09-11 ENCOUNTER — HOSPITAL ENCOUNTER (EMERGENCY)
Facility: CLINIC | Age: 49
Discharge: HOME | End: 2024-09-11
Payer: COMMERCIAL

## 2024-09-11 VITALS
HEART RATE: 87 BPM | OXYGEN SATURATION: 95 % | TEMPERATURE: 98.6 F | RESPIRATION RATE: 14 BRPM | BODY MASS INDEX: 28.88 KG/M2 | WEIGHT: 210 LBS | SYSTOLIC BLOOD PRESSURE: 133 MMHG | DIASTOLIC BLOOD PRESSURE: 93 MMHG

## 2024-09-11 DIAGNOSIS — R55 SYNCOPE AND COLLAPSE: Primary | ICD-10-CM

## 2024-09-11 PROBLEM — F32.A DEPRESSION: Status: ACTIVE | Noted: 2024-09-11

## 2024-09-11 PROBLEM — F10.20 ALCOHOL USE DISORDER, SEVERE, DEPENDENCE (H): Status: ACTIVE | Noted: 2023-08-20

## 2024-09-11 PROBLEM — F33.2 SEVERE EPISODE OF RECURRENT MAJOR DEPRESSIVE DISORDER, WITHOUT PSYCHOTIC FEATURES (H): Status: ACTIVE | Noted: 2023-08-20

## 2024-09-11 LAB
ANION GAP SERPL CALCULATED.3IONS-SCNC: 12 MMOL/L (ref 7–15)
BASOPHILS # BLD AUTO: 0.1 10E3/UL (ref 0–0.2)
BASOPHILS NFR BLD AUTO: 1 %
BUN SERPL-MCNC: 14 MG/DL (ref 6–20)
CALCIUM SERPL-MCNC: 9.5 MG/DL (ref 8.8–10.4)
CHLORIDE SERPL-SCNC: 100 MMOL/L (ref 98–107)
CREAT SERPL-MCNC: 1.15 MG/DL (ref 0.67–1.17)
EGFRCR SERPLBLD CKD-EPI 2021: 78 ML/MIN/1.73M2
EOSINOPHIL # BLD AUTO: 0 10E3/UL (ref 0–0.7)
EOSINOPHIL NFR BLD AUTO: 0 %
ERYTHROCYTE [DISTWIDTH] IN BLOOD BY AUTOMATED COUNT: 12.5 % (ref 10–15)
GLUCOSE SERPL-MCNC: 121 MG/DL (ref 70–99)
HCO3 SERPL-SCNC: 26 MMOL/L (ref 22–29)
HCT VFR BLD AUTO: 46.4 % (ref 40–53)
HGB BLD-MCNC: 16.3 G/DL (ref 13.3–17.7)
HOLD SPECIMEN: NORMAL
HOLD SPECIMEN: NORMAL
IMM GRANULOCYTES # BLD: 0.1 10E3/UL
IMM GRANULOCYTES NFR BLD: 1 %
LYMPHOCYTES # BLD AUTO: 2.2 10E3/UL (ref 0.8–5.3)
LYMPHOCYTES NFR BLD AUTO: 23 %
MCH RBC QN AUTO: 31.6 PG (ref 26.5–33)
MCHC RBC AUTO-ENTMCNC: 35.1 G/DL (ref 31.5–36.5)
MCV RBC AUTO: 90 FL (ref 78–100)
MONOCYTES # BLD AUTO: 0.5 10E3/UL (ref 0–1.3)
MONOCYTES NFR BLD AUTO: 5 %
NEUTROPHILS # BLD AUTO: 6.5 10E3/UL (ref 1.6–8.3)
NEUTROPHILS NFR BLD AUTO: 70 %
NRBC # BLD AUTO: 0 10E3/UL
NRBC BLD AUTO-RTO: 0 /100
PLATELET # BLD AUTO: 317 10E3/UL (ref 150–450)
POTASSIUM SERPL-SCNC: 3.7 MMOL/L (ref 3.4–5.3)
RBC # BLD AUTO: 5.16 10E6/UL (ref 4.4–5.9)
SODIUM SERPL-SCNC: 138 MMOL/L (ref 135–145)
TROPONIN T SERPL HS-MCNC: <6 NG/L
WBC # BLD AUTO: 9.3 10E3/UL (ref 4–11)

## 2024-09-11 PROCEDURE — 99284 EMERGENCY DEPT VISIT MOD MDM: CPT

## 2024-09-11 PROCEDURE — 93005 ELECTROCARDIOGRAM TRACING: CPT

## 2024-09-11 PROCEDURE — 36415 COLL VENOUS BLD VENIPUNCTURE: CPT | Performed by: FAMILY MEDICINE

## 2024-09-11 PROCEDURE — 93010 ELECTROCARDIOGRAM REPORT: CPT

## 2024-09-11 PROCEDURE — 85025 COMPLETE CBC W/AUTO DIFF WBC: CPT

## 2024-09-11 PROCEDURE — 85025 COMPLETE CBC W/AUTO DIFF WBC: CPT | Performed by: FAMILY MEDICINE

## 2024-09-11 PROCEDURE — 84484 ASSAY OF TROPONIN QUANT: CPT

## 2024-09-11 PROCEDURE — 250N000013 HC RX MED GY IP 250 OP 250 PS 637

## 2024-09-11 PROCEDURE — 80048 BASIC METABOLIC PNL TOTAL CA: CPT

## 2024-09-11 RX ORDER — ACETAMINOPHEN 500 MG
1000 TABLET ORAL ONCE
Status: COMPLETED | OUTPATIENT
Start: 2024-09-11 | End: 2024-09-11

## 2024-09-11 RX ADMIN — ACETAMINOPHEN 1000 MG: 500 TABLET ORAL at 19:37

## 2024-09-11 ASSESSMENT — ACTIVITIES OF DAILY LIVING (ADL)
ADLS_ACUITY_SCORE: 35

## 2024-09-11 ASSESSMENT — COLUMBIA-SUICIDE SEVERITY RATING SCALE - C-SSRS
2. HAVE YOU ACTUALLY HAD ANY THOUGHTS OF KILLING YOURSELF IN THE PAST MONTH?: NO
6. HAVE YOU EVER DONE ANYTHING, STARTED TO DO ANYTHING, OR PREPARED TO DO ANYTHING TO END YOUR LIFE?: NO
1. IN THE PAST MONTH, HAVE YOU WISHED YOU WERE DEAD OR WISHED YOU COULD GO TO SLEEP AND NOT WAKE UP?: NO

## 2024-09-11 NOTE — ED TRIAGE NOTES
"  Pt was refing a game, \" bent down to  a ball \" and had a syncopal episode per EMS, \" was out for about 5 seconds \" sugar 141, pt has a dull HA, IV infusing upon arrival      "

## 2024-09-12 ENCOUNTER — PATIENT OUTREACH (OUTPATIENT)
Dept: FAMILY MEDICINE | Facility: CLINIC | Age: 49
End: 2024-09-12
Payer: COMMERCIAL

## 2024-09-12 NOTE — TELEPHONE ENCOUNTER
Transitions of Care Outreach  Chief Complaint   Patient presents with    Hospital F/U       Most Recent Admission Date: 9/11/2024   Most Recent Admission Diagnosis:      Most Recent Discharge Date: 9/11/2024   Most Recent Discharge Diagnosis: Syncope and collapse - R55     Transitions of Care Assessment    Discharge Assessment  How are you doing now that you are home?: fine  How are your symptoms? (Red Flag symptoms escalate to triage hotline per guidelines): Improved  Do you know how to contact your clinic care team if you have future questions or changes to your health status? : Yes  Does the patient have their discharge instructions? : Yes  Does the patient have questions regarding their discharge instructions? : No  Were you started on any new medications or were there changes to any of your previous medications? : No  Does the patient have all of their medications?: Yes  Do you have questions regarding any of your medications? : No  Do you have all of your needed medical supplies or equipment (DME)?  (i.e. oxygen tank, CPAP, cane, etc.): Yes    Follow up Plan     Discharge Follow-Up  Discharge follow up appointment scheduled in alignment with recommended follow up timeframe or Transitions of Risk Category? (Low = within 30 days; Moderate= within 14 days; High= within 7 days): No  Patient's follow up appointment not scheduled: Patient declined scheduling support. Education on the importance of transitions of care follow up. Provided scheduling phone number.    Future Appointments   Date Time Provider Department Center   9/23/2024  1:30 PM Regulo Gamboa MD [facility]       Outpatient Plan as outlined on AVS reviewed with patient.    For any urgent concerns, please contact our 24 hour nurse triage line: [phone] ([phone])       Starr Drummond RN

## 2024-09-12 NOTE — TELEPHONE ENCOUNTER
Attempt #1 to reach patient, LMTCB.     Please send return call to RN line to complete TCM outreach.

## 2024-09-12 NOTE — ED PROVIDER NOTES
"[facility]  Emergency Department Visit Note    PATIENT:  Rowdy Tan     48 year old     male      [id number]    Chief complaint:  Chief Complaint   Patient presents with    Syncope     Pt was refing a game, \" bent down to  a ball \" and had a syncopal episode per EMS, \" was out for about 5 seconds \" sugar 141, pt has a dull HA, IV infusing upon arrival          History of present illness:  Patient is a 48 year old male presenting for evaluation of syncope.    Patient was in his usual state of health throughout much of the day today.  He was officiating a football game.  He was following the play when he noted that he was feeling dizzy, noted that his vision \"narrowed\".  After this he continue to follow with a play down the field when he felt lightheaded/dizzy and reported syncopal event.  Sounds like observers came to him immediately, he remembers them asking if he is okay.  They told him he was out for maybe 5 seconds.  Afterward he was feeling tired, but otherwise back to normal.  At no point including now was there is he experiencing chest pain, shortness of breath, vomiting, abdominal pain.  No lower extremity pain or edema or history of VTE.  Having a mild headache now but otherwise asymptomatic.  Did report he had COVID 10 days ago.      Review of Systems:  As in HPI above    BP (!) 133/93   Pulse 87   Temp 98.6  F (37  C) (Oral)   Resp 14   Wt 95.3 kg (210 lb)   SpO2 95%   BMI 28.88 kg/m        Physical Exam  Constitutional: laying in hospital bed, alert, oriented, and in no apparent distress  HEENT: normocephalic, atraumatic and intermittently amblyopic  Neck: able to fully range  Cardiovascular: regular rate and rhythm and no murmurs, rubs, or extra heart sounds  Pulmonary: breathing comfortably on room air and lungs clear to auscultation bilaterally  Abdominal: soft, non-tender, non-distended  Extremities/MSK: no peripheral edema  Skin: warm, dry  Neurologic: moves " all four extremities spontaneously, CNII-XII intact, 5/5  strength bilaterally, 5/5 strength in dorsiflexion and plantarflexion bilaterally, sensation intact to light touch in bilateral upper and lower extremities, ambulates without assistance, and no dysmetria on finger-nose-finger  Psychiatric: calm, appropriate      MDM:  Patient is a 48 year old male with above history presenting for evaluation of syncope.    Vitals reassuring on arrival, no note where the blood pressure changes, no tachycardia, afebrile, satting well on room air. Exam is reassuring, he appears well and is asymptomatic.    Differential at this point most concerning for exertional/heat related syncope.  Potentially other orthostatic/vasovagal but would be atypical presentation for this.  Certainly possible cardiogenic etiology given the antecedent lightheadedness/dizziness, and vision changes, though he is not experiencing chest pain or shortness of breath.  No anginal equivalent concerning for ACS.  PERC negative.  No neurologic deficit.    Plan for labs, ECG.  Acetaminophen per patient request for headache.    Disposition pending above workup and response to therapeutics. Remainder of ED course below.    ED COURSE:  ED Course as of 09/11/24 2056   Wed Sep 11, 2024   1929 EKG 12 lead  ECG:  - Ventricular rate 78 bpm, regular  - NM, QRS, QT intervals normal  - Axis normal  - no ST segment or T wave changes concerning for acute ischemia  - Comparison to prior ECGs: No significant change  - My independent interpretation: NSR with nonspecific T wave changes in aVF seen previously.  No explanation for cardiogenic etiology of syncope including no evidence of interval abnormality, Brugada pattern, ischemic findings, delta waves, epsilon waves, dagger like Q waves, or heart block     1933 Troponin T, High Sensitivity: <6  Insetting of ECG without acute ischemic changes, low concern for ACS   2056 Patient ambulated well.  Discussed overall reassuring  workup at this point with likely exertional/heat syncope.  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  He has an annual follow-up appointment with his primary doctor in a couple of weeks which I encouraged him to attend.  ED return precautions discussed in the event of development of symptoms such as chest pain, shortness of breath, or recurrent episodes of syncope.  He expressed understanding of and agreement with this.       Encounter Diagnoses:  Final diagnoses:   Syncope and collapse       Final disposition: discharge    Rafi Celis MD  9/11/2024  8:47 PM   Emergency Medicine  [facility]      Rafi Celis MD  09/11/24 2056

## 2024-09-12 NOTE — DISCHARGE INSTRUCTIONS
"You were seen in the emergency department today for passing out, the medical term for this is called \"syncope\". We did tests including blood tests and EKG that showed no clear cause for your symptoms, but was reassuring against a life-threatening cause at this time.  My best guess at this point is that your symptoms are related to heat, exertion, and perhaps mild dehydration.    Please follow up with your primary doctor as scheduled in a couple of weeks for ongoing evaluation and management of your symptoms.  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    Return to the emergency department with new or worsening symptoms that you find concerning.  "

## 2024-09-18 ASSESSMENT — SOCIAL DETERMINANTS OF HEALTH (SDOH): HOW OFTEN DO YOU GET TOGETHER WITH FRIENDS OR RELATIVES?: ONCE A WEEK

## 2024-09-23 ENCOUNTER — OFFICE VISIT (OUTPATIENT)
Dept: FAMILY MEDICINE | Facility: CLINIC | Age: 49
End: 2024-09-23
Payer: COMMERCIAL

## 2024-09-23 VITALS
RESPIRATION RATE: 14 BRPM | BODY MASS INDEX: 30.18 KG/M2 | SYSTOLIC BLOOD PRESSURE: 124 MMHG | HEIGHT: 71 IN | HEART RATE: 72 BPM | WEIGHT: 215.6 LBS | DIASTOLIC BLOOD PRESSURE: 78 MMHG | TEMPERATURE: 98 F | OXYGEN SATURATION: 97 %

## 2024-09-23 DIAGNOSIS — Z11.59 NEED FOR HEPATITIS C SCREENING TEST: ICD-10-CM

## 2024-09-23 DIAGNOSIS — E66.811 OBESITY, CLASS I, BMI 30-34.9: ICD-10-CM

## 2024-09-23 DIAGNOSIS — Z87.898 HISTORY OF ALCOHOL USE: ICD-10-CM

## 2024-09-23 DIAGNOSIS — Z13.1 SCREENING FOR DIABETES MELLITUS: ICD-10-CM

## 2024-09-23 DIAGNOSIS — Z00.00 ROUTINE GENERAL MEDICAL EXAMINATION AT A HEALTH CARE FACILITY: Primary | ICD-10-CM

## 2024-09-23 DIAGNOSIS — E78.2 MIXED HYPERLIPIDEMIA: ICD-10-CM

## 2024-09-23 DIAGNOSIS — F33.41 RECURRENT MAJOR DEPRESSIVE DISORDER, IN PARTIAL REMISSION (H): ICD-10-CM

## 2024-09-23 DIAGNOSIS — R55 VASOVAGAL SYNCOPE: ICD-10-CM

## 2024-09-23 PROBLEM — R22.1 LUMP ON NECK: Status: RESOLVED | Noted: 2018-05-11 | Resolved: 2024-09-23

## 2024-09-23 LAB
ALBUMIN SERPL BCG-MCNC: 4.9 G/DL (ref 3.5–5.2)
ALP SERPL-CCNC: 89 U/L (ref 40–150)
ALT SERPL W P-5'-P-CCNC: 48 U/L (ref 0–70)
ANION GAP SERPL CALCULATED.3IONS-SCNC: 12 MMOL/L (ref 7–15)
AST SERPL W P-5'-P-CCNC: 30 U/L (ref 0–45)
BILIRUB SERPL-MCNC: 0.8 MG/DL
BUN SERPL-MCNC: 11.9 MG/DL (ref 6–20)
CALCIUM SERPL-MCNC: 9.9 MG/DL (ref 8.8–10.4)
CHLORIDE SERPL-SCNC: 100 MMOL/L (ref 98–107)
CHOLEST SERPL-MCNC: 279 MG/DL
CREAT SERPL-MCNC: 0.98 MG/DL (ref 0.67–1.17)
EGFRCR SERPLBLD CKD-EPI 2021: >90 ML/MIN/1.73M2
FASTING STATUS PATIENT QL REPORTED: NO
FASTING STATUS PATIENT QL REPORTED: NO
GLUCOSE SERPL-MCNC: 86 MG/DL (ref 70–99)
HCO3 SERPL-SCNC: 28 MMOL/L (ref 22–29)
HCV AB SERPL QL IA: NONREACTIVE
HDLC SERPL-MCNC: 45 MG/DL
LDLC SERPL CALC-MCNC: 204 MG/DL
NONHDLC SERPL-MCNC: 234 MG/DL
POTASSIUM SERPL-SCNC: 3.9 MMOL/L (ref 3.4–5.3)
PROT SERPL-MCNC: 8 G/DL (ref 6.4–8.3)
SODIUM SERPL-SCNC: 140 MMOL/L (ref 135–145)
TRIGL SERPL-MCNC: 152 MG/DL

## 2024-09-23 PROCEDURE — 91320 SARSCV2 VAC 30MCG TRS-SUC IM: CPT | Performed by: STUDENT IN AN ORGANIZED HEALTH CARE EDUCATION/TRAINING PROGRAM

## 2024-09-23 PROCEDURE — 99213 OFFICE O/P EST LOW 20 MIN: CPT | Mod: 25 | Performed by: STUDENT IN AN ORGANIZED HEALTH CARE EDUCATION/TRAINING PROGRAM

## 2024-09-23 PROCEDURE — 80053 COMPREHEN METABOLIC PANEL: CPT | Performed by: STUDENT IN AN ORGANIZED HEALTH CARE EDUCATION/TRAINING PROGRAM

## 2024-09-23 PROCEDURE — 86803 HEPATITIS C AB TEST: CPT | Performed by: STUDENT IN AN ORGANIZED HEALTH CARE EDUCATION/TRAINING PROGRAM

## 2024-09-23 PROCEDURE — 90480 ADMN SARSCOV2 VAC 1/ONLY CMP: CPT | Performed by: STUDENT IN AN ORGANIZED HEALTH CARE EDUCATION/TRAINING PROGRAM

## 2024-09-23 PROCEDURE — 99396 PREV VISIT EST AGE 40-64: CPT | Mod: 57 | Performed by: STUDENT IN AN ORGANIZED HEALTH CARE EDUCATION/TRAINING PROGRAM

## 2024-09-23 PROCEDURE — 36415 COLL VENOUS BLD VENIPUNCTURE: CPT | Performed by: STUDENT IN AN ORGANIZED HEALTH CARE EDUCATION/TRAINING PROGRAM

## 2024-09-23 PROCEDURE — 80061 LIPID PANEL: CPT | Performed by: STUDENT IN AN ORGANIZED HEALTH CARE EDUCATION/TRAINING PROGRAM

## 2024-09-23 PROCEDURE — 90471 IMMUNIZATION ADMIN: CPT | Performed by: STUDENT IN AN ORGANIZED HEALTH CARE EDUCATION/TRAINING PROGRAM

## 2024-09-23 PROCEDURE — 90656 IIV3 VACC NO PRSV 0.5 ML IM: CPT | Performed by: STUDENT IN AN ORGANIZED HEALTH CARE EDUCATION/TRAINING PROGRAM

## 2024-09-23 RX ORDER — CETIRIZINE HYDROCHLORIDE 10 MG/1
10 TABLET ORAL DAILY
COMMUNITY

## 2024-09-23 ASSESSMENT — PATIENT HEALTH QUESTIONNAIRE - PHQ9
10. IF YOU CHECKED OFF ANY PROBLEMS, HOW DIFFICULT HAVE THESE PROBLEMS MADE IT FOR YOU TO DO YOUR WORK, TAKE CARE OF THINGS AT HOME, OR GET ALONG WITH OTHER PEOPLE: NOT DIFFICULT AT ALL
SUM OF ALL RESPONSES TO PHQ QUESTIONS 1-9: 3
SUM OF ALL RESPONSES TO PHQ QUESTIONS 1-9: 3

## 2024-09-23 NOTE — PROGRESS NOTES
Preventive Care Visit  [facility]  Regulo Gamboa MD, Family Medicine  Sep 23, 2024      Assessment & Plan     Routine general medical examination at a health care facility  Jameel is a 48-year-old male presenting today for adult preventative exam.    He is up-to-date on colonoscopy, colon cancer screening done October 2023, due to polyp found in transverse colon on colonoscopy recommended to repeat in 7 years in October 2030.    He was agreeable to getting his influenza and COVID vaccinations today, his were given in clinic    Recommend for hepatitis C screening per USPSTF guidelines, he was agreeable and this was ordered.    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  Preceding the syncopal episode he had tunnel vision in where his vision narrowed.  He reports he was not moving at the time that the syncope occurred, not running.  He was never experiencing chest pain, shortness of breath, vomiting or abdominal pain.  Reports he did have COVID 10 days prior to the event.    He is brought to the emergency room via EMS, EKG was done showing normal sinus rhythm, I reviewed the EKG independently and agree with the ER physician that it is a normal sinus rhythm, normal EKG. troponin high-sensitivity was undetectable.      Is likely that patient suffered from vasovagal syncope given his symptoms.  Likely trigger is heat.  I advised him that if he has any recurrent episodes that we should further evaluate but he should avoid heat, and if in a hot environment for prolonged period of time should be well-hydrated to avoid recurrence.  Should attempt to stay out of direct sun.  He reports understanding.    Need for hepatitis C screening test    - Hepatitis C Screen Reflex to HCV RNA Quant and Genotype  - Hepatitis C Screen Reflex to HCV RNA  "Quant and Genotype    Recurrent major depressive disorder, in partial remission (H24)  Has history of depression, it is well-controlled on Zoloft 50 mg daily, we will continue the current medicine.  - sertraline (ZOLOFT) 50 MG tablet  Dispense: 90 tablet; Refill: 3      History of alcohol use  Has maintained alcohol sobriety since August 2023, reporting that he is doing well, is not having cravings.  Will continue to follow-up at future visits.    Mixed hyperlipidemia  Has previously had high cholesterol level, recommend repeat today.  Will evaluate ASCVD risk, if necessary will recommend for diet versus medication to reduce cholesterol levels.  - Lipid panel reflex to direct LDL Fasting  - Lipid panel reflex to direct LDL Fasting    Screening for diabetes mellitus    - Comprehensive metabolic panel (BMP + Alb, Alk Phos, ALT, AST, Total. Bili, TP)  - Comprehensive metabolic panel (BMP + Alb, Alk Phos, ALT, AST, Total. Bili, TP)    Obesity, Class I, BMI 30-34.9        BMI  Estimated body mass index is 30.39 kg/m  as calculated from the following:    Height as of this encounter: 1.794 m (5' 10.63\").    Weight as of this encounter: 97.8 kg (215 lb 9.6 oz).   Weight management plan: Discussed healthy diet and exercise guidelines    Counseling  Appropriate preventive services were addressed with this patient via screening, questionnaire, or discussion as appropriate for fall prevention, nutrition, physical activity, Tobacco-use cessation, social engagement, weight loss and cognition.  Checklist reviewing preventive services available has been given to the patient.  Reviewed patient's diet, addressing concerns and/or questions.   The patient was instructed to see the dentist every 6 months.       Follow-up annually    Subjective   Jameel is a 48 year old, presenting for the following:  Physical (Fainted and had seizure on 09/11/2024, reports feeling a little off since discharge.)        9/23/2024     1:02 PM   Additional " Questions   Roomed by REED DEAN        Health Care Directive  Patient does not have a Health Care Directive or Living Will: Discussed advance care planning with patient; information given to patient to review.    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 It was a hot day, in the mid 80s. He remembers feeling warm at the time, but not excessively.  His vision narrowed, he got down onto one knee and then he lost consciousness on the sideline.     He was woken up after about one minute,  was told he was 'seizing' for about 5 seconds of that when he was unconsciouss.  He was not given any description of what the observers saw. He felt well rested after this episode, but otherwise felt well. His watch told him his heart rate was 174 at the time his passed out.     Reports this happened to him about 15 years ago.  He got out of the shower and had loss of consciousness, fell and injured his finger.     Had COVID 19 at the end of August, had taken paxlovid.            9/18/2024   General Health   How would you rate your overall physical health? Good   Feel stress (tense, anxious, or unable to sleep) Not at all            9/18/2024   Nutrition   Three or more servings of calcium each day? Yes   Diet: Regular (no restrictions)   How many servings of fruit and vegetables per day? (!) 2-3   How many sweetened beverages each day? (!) 3            9/18/2024   Exercise   Days per week of moderate/strenous exercise 4 days   Average minutes spent exercising at this level 30 min            9/18/2024   Social Factors   Frequency of gathering with friends or relatives Once a week   Worry food won't last until get money to buy more No   Food not last or not have enough money for food? No   Do you have housing? (Housing is defined as stable permanent housing and does not include staying ouside in a car, in a tent, in an abandoned building, in an overnight shelter, or couch-surfing.) Yes   Are you  worried about losing your housing? No   Lack of transportation? No   Unable to get utilities (heat,electricity)? No            2024   Dental   Dentist two times every year? (!) NO            2024   TB Screening   Were you born outside of the [place]? No          Today's PHQ-9 Score:       2024     1:02 PM   PHQ-9 SCORE   PHQ-9 Total Score MyChart 3 (Minimal depression)   PHQ-9 Total Score 3         2024   Substance Use   Alcohol more than 3/day or more than 7/wk Not Applicable   Do you use any other substances recreationally? No        Social History     Tobacco Use    Smoking status: Former     Current packs/day: 0.00     Types: Cigarettes     Quit date: 1996     Years since quittin.4     Passive exposure: Past    Smokeless tobacco: Current     Types: Chew   Vaping Use    Vaping status: Never Used   Substance Use Topics    Alcohol use: Not Currently     Alcohol/week: 3.0 - 5.0 standard drinks of alcohol     Types: 3 - 5 Standard drinks or equivalent per week     Comment: Has not drank since getting admitted in Aug 2023.    Drug use: Not Currently             2024   One time HIV Screening   Previous HIV test? Yes          2024   STI Screening   New sexual partner(s) since last STI/HIV test? No      ASCVD Risk   The 10-year ASCVD risk score (Clarissa RAMIREZ, et al., 2019) is: 4.4%    Values used to calculate the score:      Age: 48 years      Sex: Male      Is Non- : No      Diabetic: No      Tobacco smoker: No      Systolic Blood Pressure: 124 mmHg      Is BP treated: No      HDL Cholesterol: 42 mg/dL      Total Cholesterol: 230 mg/dL        2024   Contraception/Family Planning   Questions about contraception or family planning No           Reviewed and updated as needed this visit by Provider                    Past Medical History:   Diagnosis Date    Alcohol use disorder     Depressive disorder 22    Major depression in partial remission (H24)      Right inguinal hernia     Fat containing, CT previously done in 2023    Right knee pain      Past Surgical History:   Procedure Laterality Date    COLONOSCOPY N/A 10/17/2023    Procedure: Colonoscopy WITH POLYPECTOMY USING HOT SNARE;  Surgeon: Camelia Mcallister MD;  Location: [facility] OR    ESOPHAGOSCOPY, GASTROSCOPY, DUODENOSCOPY (EGD), COMBINED N/A 10/17/2023    Procedure: Esophagoscopy, gastroscopy, duodenoscopy (EGD), combined;  Surgeon: Camelia Mcallister MD;  Location: [facility] OR    RI TENEX PROCEDURE (ULTRASONIC TENOTOMY)  06/2024    right knee arthrosoopy Right     for meniscectomy, removal of plicca    SOFT TISSUE SURGERY  7/3/24    Procedure on torn patellar tendon     Labs reviewed in EPIC  BP Readings from Last 3 Encounters:   09/23/24 124/78   09/11/24 (!) 133/93   10/17/23 116/75    Wt Readings from Last 3 Encounters:   09/23/24 97.8 kg (215 lb 9.6 oz)   09/11/24 95.3 kg (210 lb)   10/17/23 89.4 kg (197 lb)                  Patient Active Problem List   Diagnosis    Streptococcal Sore Throat    Lump on neck    Alcohol use disorder, severe, dependence (H)    Depression    Severe episode of recurrent major depressive disorder, without psychotic features (H)     Past Surgical History:   Procedure Laterality Date    COLONOSCOPY N/A 10/17/2023    Procedure: Colonoscopy WITH POLYPECTOMY USING HOT SNARE;  Surgeon: Camelia Mcallister MD;  Location: [facility] OR    ESOPHAGOSCOPY, GASTROSCOPY, DUODENOSCOPY (EGD), COMBINED N/A 10/17/2023    Procedure: Esophagoscopy, gastroscopy, duodenoscopy (EGD), combined;  Surgeon: Camelia Mcallister MD;  Location: [facility] OR    RI TENEX PROCEDURE (ULTRASONIC TENOTOMY)  06/2024    right knee arthrosoopy Right     for meniscectomy, removal of plicca    SOFT TISSUE SURGERY  7/3/24    Procedure on torn patellar tendon       Social History     Tobacco Use    Smoking status: Former     Current packs/day: 0.00     Types: Cigarettes     Quit date:  "1996     Years since quittin.4     Passive exposure: Past    Smokeless tobacco: Current     Types: Chew   Substance Use Topics    Alcohol use: Not Currently     Alcohol/week: 3.0 - 5.0 standard drinks of alcohol     Types: 3 - 5 Standard drinks or equivalent per week     Comment: Quit 2023     Family History   Problem Relation Age of Onset    Diabetes Father     Hypertension Father     Hyperlipidemia Father     Cerebrovascular Disease Maternal Grandfather     Breast Cancer Paternal Grandmother          Current Outpatient Medications   Medication Sig Dispense Refill    sertraline (ZOLOFT) 50 MG tablet Take 1 tablet (50 mg) by mouth daily 90 tablet 3     Allergies   Allergen Reactions    Aspirin Anaphylaxis    Aspirin (Tartrazine Only) [Tartrazine] Anaphylaxis     Recent Labs   Lab Test 24  1844 23  1628 21  1353 21  0732   A1C  --   --   --  5.1   LDL  --  155*  --  138*   HDL  --  42  --  47   TRIG  --  167*  --  217*   ALT  --  38  --   --    CR 1.15 1.03   < >  --    GFRESTIMATED 78 90   < >  --    POTASSIUM 3.7 3.8   < >  --     < > = values in this interval not displayed.          Review of Systems  Constitutional, neuro, ENT, endocrine, pulmonary, cardiac, gastrointestinal, genitourinary, musculoskeletal, integument and psychiatric systems are negative, except as otherwise noted.     Objective    Exam  /78 (BP Location: Right arm, Patient Position: Sitting, Cuff Size: Adult Large)   Pulse 72   Temp 98  F (36.7  C) (Oral)   Resp 14   Ht 1.794 m (5' 10.63\")   Wt 97.8 kg (215 lb 9.6 oz)   SpO2 97%   BMI 30.39 kg/m     Estimated body mass index is 30.39 kg/m  as calculated from the following:    Height as of this encounter: 1.794 m (5' 10.63\").    Weight as of this encounter: 97.8 kg (215 lb 9.6 oz).    Physical Exam  GENERAL: alert and no distress  EYES: Eyes grossly normal to inspection, PERRL and conjunctivae and sclerae normal  HENT: ear canals and TM's " normal, nose and mouth without ulcers or lesions  NECK: no adenopathy, no asymmetry, masses, or scars  RESP: lungs clear to auscultation - no rales, rhonchi or wheezes  CV: regular rate and rhythm, normal S1 S2, no S3 or S4, no murmur, click or rub, no peripheral edema  ABDOMEN: soft, nontender, no hepatosplenomegaly, no masses and bowel sounds normal  MS: no gross musculoskeletal defects noted, no edema  SKIN: no suspicious lesions or rashes  NEURO: Normal strength and tone, mentation intact and speech normal  PSYCH: mentation appears normal, affect normal/bright        Signed Electronically by: Regulo Gamboa MD

## 2024-09-24 DIAGNOSIS — E78.2 MIXED HYPERLIPIDEMIA: Primary | ICD-10-CM

## 2024-09-24 PROBLEM — F33.2 SEVERE EPISODE OF RECURRENT MAJOR DEPRESSIVE DISORDER, WITHOUT PSYCHOTIC FEATURES (H): Status: RESOLVED | Noted: 2023-08-20 | Resolved: 2024-09-24

## 2024-09-24 RX ORDER — ROSUVASTATIN CALCIUM 5 MG/1
5 TABLET, COATED ORAL DAILY
Qty: 90 TABLET | Refills: 3 | Status: SHIPPED | OUTPATIENT
Start: 2024-09-24

## 2024-09-24 NOTE — PATIENT INSTRUCTIONS
Patient Education   Preventive Care Advice   This is general advice given by our system to help you stay healthy. However, your care team may have specific advice just for you. Please talk to your care team about your preventive care needs.  Nutrition  Eat 5 or more servings of fruits and vegetables each day.  Try wheat bread, brown rice and whole grain pasta (instead of white bread, rice, and pasta).  Get enough calcium and vitamin D. Check the label on foods and aim for 100% of the RDA (recommended daily allowance).  Lifestyle  Exercise at least 150 minutes each week  (30 minutes a day, 5 days a week).  Do muscle strengthening activities 2 days a week. These help control your weight and prevent disease.  No smoking.  Wear sunscreen to prevent skin cancer.  Have a dental exam and cleaning every 6 months.  Yearly exams  See your health care team every year to talk about:  Any changes in your health.  Any medicines your care team has prescribed.  Preventive care, family planning, and ways to prevent chronic diseases.  Shots (vaccines)   HPV shots (up to age 26), if you've never had them before.  Hepatitis B shots (up to age 59), if you've never had them before.  COVID-19 shot: Get this shot when it's due.  Flu shot: Get a flu shot every year.  Tetanus shot: Get a tetanus shot every 10 years.  Pneumococcal, hepatitis A, and RSV shots: Ask your care team if you need these based on your risk.  Shingles shot (for age 50 and up)  General health tests  Diabetes screening:  Starting at age 35, Get screened for diabetes at least every 3 years.  If you are younger than age 35, ask your care team if you should be screened for diabetes.  Cholesterol test: At age 39, start having a cholesterol test every 5 years, or more often if advised.  Bone density scan (DEXA): At age 50, ask your care team if you should have this scan for osteoporosis (brittle bones).  Hepatitis C: Get tested at least once in your life.  STIs (sexually  transmitted infections)  Before age 24: Ask your care team if you should be screened for STIs.  After age 24: Get screened for STIs if you're at risk. You are at risk for STIs (including HIV) if:  You are sexually active with more than one person.  You don't use condoms every time.  You or a partner was diagnosed with a sexually transmitted infection.  If you are at risk for HIV, ask about PrEP medicine to prevent HIV.  Get tested for HIV at least once in your life, whether you are at risk for HIV or not.  Cancer screening tests  Cervical cancer screening: If you have a cervix, begin getting regular cervical cancer screening tests starting at age 21.  Breast cancer scan (mammogram): If you've ever had breasts, begin having regular mammograms starting at age 40. This is a scan to check for breast cancer.  Colon cancer screening: It is important to start screening for colon cancer at age 45.  Have a colonoscopy test every 10 years (or more often if you're at risk) Or, ask your provider about stool tests like a FIT test every year or Cologuard test every 3 years.  To learn more about your testing options, visit:   .  For help making a decision, visit:   https://bit.ly/ck97957.  Prostate cancer screening test: If you have a prostate, ask your care team if a prostate cancer screening test (PSA) at age 55 is right for you.  Lung cancer screening: If you are a current or former smoker ages 50 to 80, ask your care team if ongoing lung cancer screenings are right for you.  For informational purposes only. Not to replace the advice of your health care provider. Copyright   2023 [employer]. All rights reserved. Clinically reviewed by the [facility] Transitions Program. [employer] [id number] - REV 01/24.

## 2024-12-23 ENCOUNTER — LAB (OUTPATIENT)
Dept: LAB | Facility: CLINIC | Age: 49
End: 2024-12-23
Payer: COMMERCIAL

## 2024-12-23 DIAGNOSIS — E78.2 MIXED HYPERLIPIDEMIA: ICD-10-CM

## 2024-12-23 LAB
CHOLEST SERPL-MCNC: 157 MG/DL
FASTING STATUS PATIENT QL REPORTED: NO
HDLC SERPL-MCNC: 41 MG/DL
LDLC SERPL CALC-MCNC: 83 MG/DL
NONHDLC SERPL-MCNC: 116 MG/DL
TRIGL SERPL-MCNC: 166 MG/DL

## 2024-12-23 PROCEDURE — 36415 COLL VENOUS BLD VENIPUNCTURE: CPT

## 2024-12-23 PROCEDURE — 80061 LIPID PANEL: CPT

## 2025-04-08 ENCOUNTER — TRANSFERRED RECORDS (OUTPATIENT)
Dept: HEALTH INFORMATION MANAGEMENT | Facility: CLINIC | Age: 50
End: 2025-04-08
Payer: COMMERCIAL

## 2025-08-07 ENCOUNTER — TRANSFERRED RECORDS (OUTPATIENT)
Dept: HEALTH INFORMATION MANAGEMENT | Facility: CLINIC | Age: 50
End: 2025-08-07
Payer: COMMERCIAL

## 2025-09-02 ASSESSMENT — PATIENT HEALTH QUESTIONNAIRE - PHQ9
SUM OF ALL RESPONSES TO PHQ QUESTIONS 1-9: 0
SUM OF ALL RESPONSES TO PHQ QUESTIONS 1-9: 0
10. IF YOU CHECKED OFF ANY PROBLEMS, HOW DIFFICULT HAVE THESE PROBLEMS MADE IT FOR YOU TO DO YOUR WORK, TAKE CARE OF THINGS AT HOME, OR GET ALONG WITH OTHER PEOPLE: NOT DIFFICULT AT ALL

## 2025-09-03 ENCOUNTER — OFFICE VISIT (OUTPATIENT)
Dept: FAMILY MEDICINE | Facility: CLINIC | Age: 50
End: 2025-09-03
Payer: COMMERCIAL

## 2025-09-03 VITALS
OXYGEN SATURATION: 97 % | BODY MASS INDEX: 31.78 KG/M2 | DIASTOLIC BLOOD PRESSURE: 80 MMHG | WEIGHT: 227 LBS | HEART RATE: 68 BPM | TEMPERATURE: 97.6 F | SYSTOLIC BLOOD PRESSURE: 120 MMHG | HEIGHT: 71 IN

## 2025-09-03 DIAGNOSIS — E78.2 MIXED HYPERLIPIDEMIA: ICD-10-CM

## 2025-09-03 DIAGNOSIS — Z01.818 PREOP GENERAL PHYSICAL EXAM: Primary | ICD-10-CM

## 2025-09-03 DIAGNOSIS — F33.42 RECURRENT MAJOR DEPRESSIVE DISORDER, IN FULL REMISSION: ICD-10-CM

## 2025-09-03 DIAGNOSIS — S43.431D LABRAL TEAR OF SHOULDER, RIGHT, SUBSEQUENT ENCOUNTER: ICD-10-CM

## 2025-09-03 DIAGNOSIS — M75.21 BICEPS TENDINITIS OF RIGHT SHOULDER: ICD-10-CM

## 2025-09-03 PROCEDURE — 3079F DIAST BP 80-89 MM HG: CPT | Performed by: STUDENT IN AN ORGANIZED HEALTH CARE EDUCATION/TRAINING PROGRAM

## 2025-09-03 PROCEDURE — G2211 COMPLEX E/M VISIT ADD ON: HCPCS | Performed by: STUDENT IN AN ORGANIZED HEALTH CARE EDUCATION/TRAINING PROGRAM

## 2025-09-03 PROCEDURE — 99214 OFFICE O/P EST MOD 30 MIN: CPT | Performed by: STUDENT IN AN ORGANIZED HEALTH CARE EDUCATION/TRAINING PROGRAM

## 2025-09-03 PROCEDURE — 3074F SYST BP LT 130 MM HG: CPT | Performed by: STUDENT IN AN ORGANIZED HEALTH CARE EDUCATION/TRAINING PROGRAM

## 2025-09-03 RX ORDER — KETOCONAZOLE 20 MG/G
CREAM TOPICAL
COMMUNITY
Start: 2025-04-08

## 2025-09-03 RX ORDER — HYDROCORTISONE 25 MG/G
CREAM TOPICAL
COMMUNITY
Start: 2025-04-08

## (undated) DEVICE — GOWN IMPERVIOUS 2XL BLUE

## (undated) DEVICE — SOL WATER IRRIG 500ML BOTTLE 2F7113

## (undated) DEVICE — ASSURANCE CLIP

## (undated) DEVICE — SNARE CAPIVATOR ROUND COLD SNR BX10 M00561101

## (undated) DEVICE — KIT ENDO TURNOVER/PROCEDURE CARRY-ON 101822

## (undated) DEVICE — ENDO TRAP POLYP E-TRAP 00711099

## (undated) DEVICE — ENDO BITE BLOCK ADULT OMNI-BLOC

## (undated) DEVICE — SUCTION CATH AIRLIFE TRI-FLO W/CONTROL PORT 14FR  T60C

## (undated) DEVICE — SPECIMEN CONTAINER 3OZ W/FORMALIN 59901

## (undated) DEVICE — ESU GROUND PAD ADULT W/CORD E7507

## (undated) DEVICE — TUBING SUCTION MEDI-VAC 1/4"X20' N620A

## (undated) DEVICE — SYR 30ML SLIP TIP W/O NDL 302833

## (undated) DEVICE — GLOVE EXAM NITRILE LG PF LATEX FREE 5064

## (undated) DEVICE — SUCTION MANIFOLD NEPTUNE 2 SYS 1 PORT 702-025-000